# Patient Record
Sex: FEMALE | Race: BLACK OR AFRICAN AMERICAN | Employment: UNEMPLOYED | URBAN - METROPOLITAN AREA
[De-identification: names, ages, dates, MRNs, and addresses within clinical notes are randomized per-mention and may not be internally consistent; named-entity substitution may affect disease eponyms.]

---

## 2017-03-21 ENCOUNTER — OFFICE VISIT (OUTPATIENT)
Dept: FAMILY MEDICINE CLINIC | Age: 47
End: 2017-03-21

## 2017-03-21 VITALS
TEMPERATURE: 97.8 F | BODY MASS INDEX: 29.15 KG/M2 | HEIGHT: 62 IN | OXYGEN SATURATION: 98 % | RESPIRATION RATE: 16 BRPM | WEIGHT: 158.4 LBS | DIASTOLIC BLOOD PRESSURE: 79 MMHG | SYSTOLIC BLOOD PRESSURE: 127 MMHG

## 2017-03-21 DIAGNOSIS — E55.9 VITAMIN D DEFICIENCY: ICD-10-CM

## 2017-03-21 DIAGNOSIS — D50.9 IRON DEFICIENCY ANEMIA, UNSPECIFIED IRON DEFICIENCY ANEMIA TYPE: Primary | ICD-10-CM

## 2017-03-21 RX ORDER — GLUCOSAMINE SULFATE 1500 MG
POWDER IN PACKET (EA) ORAL DAILY
COMMUNITY
End: 2018-02-19

## 2017-03-21 NOTE — MR AVS SNAPSHOT
Visit Information Date & Time Provider Department Dept. Phone Encounter #  
 3/21/2017 10:45 AM Shana Mendez MD [facility] OFFICE-[facility] [phone] [id number] Upcoming Health Maintenance Date Due  
 PAP AKA CERVICAL CYTOLOGY 11/1/2017 DTaP/Tdap/Td series (2 - Td) 7/20/2022 Allergies as of 3/21/2017  Review Complete On: 3/21/2017 By: Karen Martin LPN Severity Noted Reaction Type Reactions Percocet [Oxycodone-acetaminophen]  04/26/2011    Itching Current Immunizations  Reviewed on 7/20/2012 Name Date TDAP Vaccine 7/20/2012 Not reviewed this visit You Were Diagnosed With   
  
 Codes Comments Iron deficiency anemia, unspecified iron deficiency anemia type    -  Primary ICD-10-CM: D50.9 ICD-9-CM: 280.9 Vitamin D deficiency     ICD-10-CM: E55.9 ICD-9-CM: 268.9 Vitals BP Temp Resp Height(growth percentile) Weight(growth percentile) LMP  
 127/79 97.8 °F (36.6 °C) (Oral) 16 5' 1.5\" (1.562 m) 158 lb 6.4 oz (71.8 kg) 03/07/2017 (Exact Date) SpO2 BMI OB Status Smoking Status 98% 29.44 kg/m2 Having regular periods Former Smoker Vitals History BMI and BSA Data Body Mass Index Body Surface Area  
 29.44 kg/m 2 1.77 m 2 Preferred Pharmacy Pharmacy Name Phone [facility] [address] [place] [address] [phone] Your Updated Medication List  
  
   
This list is accurate as of: 3/21/17 11:25 AM.  Always use your most recent med list.  
  
  
  
  
 acetaminophen 500 mg tablet Commonly known as:  TYLENOL Take 500 mg by mouth every six (6) hours as needed for Pain. fluocinoNIDE 0.05 % topical cream  
Commonly known as:  LIDEX Apply  to affected area two (2) times a day. multivitamin, tx-iron-ca-min 9 mg iron-400 mcg Tab tablet Commonly known as:  THERA-M w/ IRON Take 1 Tab by mouth daily. VITAMIN D3 1,000 unit Cap Generic drug:  cholecalciferol Take  by mouth daily. Pt taking every other day We Performed the Following CBC WITH AUTOMATED DIFF [63507 CPT(R)] VITAMIN D, 25 HYDROXY [id number] CPT(R)] Introducing [facility]! [employer] introduces [employer] patient portal. Now you can access parts of your medical record, email your doctor's office, and request medication refills online. 1. In your internet browser, go to [email or web address] 2. Click on the First Time User? Click Here link in the Sign In box. You will see the New Member Sign Up page. 3. Enter your [employer]ista Access Code exactly as it appears below. You will not need to use this code after youve completed the sign-up process. If you do not sign up before the expiration date, you must request a new code. · [employer]ista Access Code: [email or web address] Expires: 6/19/2017 11:25 AM 
 
4. Enter the last four digits of your Social Security Number (xxxx) and Date of Birth (mm/dd/yyyy) as indicated and click Submit. You will be taken to the next sign-up page. 5. Create a Foodista ID. This will be your [employer]ista login ID and cannot be changed, so think of one that is secure and easy to remember. 6. Create a [employer]ista password. You can change your password at any time. 7. Enter your Password Reset Question and Answer. This can be used at a later time if you forget your password. 8. Enter your e-mail address. You will receive e-mail notification when new information is available in [address] 9. Click Sign Up. You can now view and download portions of your medical record. 10. Click the Download Summary menu link to download a portable copy of your medical information. If you have questions, please visit the Frequently Asked Questions section of the [employer]ista website. Remember, [employer] is NOT to be used for urgent needs. For medical emergencies, dial 911. Now available from your iPhone and Android! Please provide this summary of care documentation to your next provider. Your primary care clinician is listed as Mack Bernal. If you have any questions after today's visit, please call [phone].

## 2017-03-21 NOTE — PROGRESS NOTES
Chief Complaint   Patient presents with    Vitamin D Deficiency     1. Have you been to the ER, urgent care clinic since your last visit? Hospitalized since your last visit? [facility]    2. Have you seen or consulted any other health care providers outside of the [address] [place] [address] since your last visit? Include any pap smears or colon screening. No     There are no preventive care reminders to display for this patient.

## 2017-03-21 NOTE — PROGRESS NOTES
HISTORY OF PRESENT ILLNESS  Esdras Anne is a 40 y.o. female here today for follow up of H[place] and Vitamin D deficiency. She is feeling well but stopped her iron pills because of constipation. She is taking One A Day, we talked about switching that to a prenatal to get more iron. She is also taking OTC Vit D 1000 every other day. Continues to follow w GYN for her periods which are still heavy. Abnormal Lab Results   The history is provided by the patient. This is a chronic problem. The current episode started more than 1 week ago. Pertinent negatives include no chest pain, no abdominal pain and no shortness of breath. Review of Systems   Respiratory: Negative for shortness of breath. Cardiovascular: Negative for chest pain. Gastrointestinal: Negative for abdominal pain. Physical Exam   Constitutional: She is oriented to person, place, and time. She appears well-developed and well-nourished. /79  Temp 97.8 °F (36.6 °C) (Oral)   Resp 16  Ht 5' 1.5\" (1.562 m)  Wt 158 lb 6.4 oz (71.8 kg)  LMP 03/07/2017 (Exact Date)  SpO2 98%  BMI 29.44 kg/m2     HENT:   Head: Normocephalic and atraumatic. Eyes: No scleral icterus. Neck: No thyromegaly present. Cardiovascular: Normal heart sounds. Pulmonary/Chest: Breath sounds normal.   Abdominal: Soft. There is no tenderness. Musculoskeletal: She exhibits no edema. Lymphadenopathy:     She has no cervical adenopathy. Neurological: She is alert and oriented to person, place, and time. Nursing note and vitals reviewed.     Patient Active Problem List   Diagnosis Code    Eczema L30.9    Vitamin D deficiency E55.9    History of bilateral tubal ligation Z98.51    Iron deficiency anemia D50.9     Past Medical History:   Diagnosis Date    Contact dermatitis and other eczema, due to unspecified cause     Headache(784.0)     [facility]     Social History     Social History    Marital status: SINGLE     Spouse name: N/A    Number of children: 1    Years of education: N/A     Occupational History          care giver      Social History Main Topics    Smoking status: Former Smoker     Years: 1.00     Quit date: 1/1/1994    Smokeless tobacco: Never Used      Comment: 1 pack yr    Alcohol use Yes      Comment: rarely     Drug use: No    Sexual activity: Yes     Partners: Male     Birth control/ protection: Condom, Surgical      Comment: single     Other Topics Concern    None     Social History Narrative     Family History   Problem Relation Age of Onset    Diabetes Mother     Hypertension Mother     Heart Disease Mother     Kidney Disease Mother      esrd on dilaysis     Heart Disease Father     Diabetes Sister     Hypertension Sister     Hypertension Brother     Diabetes Brother      Current Outpatient Prescriptions   Medication Sig    cholecalciferol (VITAMIN D3) 1,000 unit cap Take  by mouth daily. Pt taking every other day    multivitamin, tx-iron-ca-min (THERA-M W/ IRON) 9 mg iron-400 mcg tab tablet Take 1 Tab by mouth daily.  acetaminophen (TYLENOL) 500 mg tablet Take 500 mg by mouth every six (6) hours as needed for Pain.  fluocinoNIDE (LIDEX) 0.05 % topical cream Apply  to affected area two (2) times a day. Allergies   Allergen Reactions    Percocet [Oxycodone-Acetaminophen] Itching       ASSESSMENT and PLAN  Pt feeling well, labs pending, cont OTC supplements, will contact her w lab results and follow up recommendations. Care plan reviewed and pt understands. After visit summary printed and reviewed with patient. Arnold Juniorthony was seen today for vitamin d deficiency.     Diagnoses and all orders for this visit:    Iron deficiency anemia, unspecified iron deficiency anemia type  -     CBC WITH AUTOMATED DIFF    Vitamin D deficiency  -     VITAMIN D, 25 HYDROXY

## 2017-03-22 LAB
25(OH)D3+25(OH)D2 SERPL-MCNC: 13.3 NG/ML (ref 30–100)
BASOPHILS # BLD AUTO: 0 X10E3/UL (ref 0–0.2)
BASOPHILS NFR BLD AUTO: 0 %
EOSINOPHIL # BLD AUTO: 0 X10E3/UL (ref 0–0.4)
EOSINOPHIL NFR BLD AUTO: 1 %
ERYTHROCYTE [DISTWIDTH] IN BLOOD BY AUTOMATED COUNT: 16.3 % (ref 12.3–15.4)
HCT VFR BLD AUTO: 31.5 % (ref 34–46.6)
HGB BLD-MCNC: 9.7 G/DL (ref 11.1–15.9)
IMM GRANULOCYTES # BLD: 0 X10E3/UL (ref 0–0.1)
IMM GRANULOCYTES NFR BLD: 0 %
LYMPHOCYTES # BLD AUTO: 1.6 X10E3/UL (ref 0.7–3.1)
LYMPHOCYTES NFR BLD AUTO: 26 %
MCH RBC QN AUTO: 23.5 PG (ref 26.6–33)
MCHC RBC AUTO-ENTMCNC: 30.8 G/DL (ref 31.5–35.7)
MCV RBC AUTO: 76 FL (ref 79–97)
MONOCYTES # BLD AUTO: 0.7 X10E3/UL (ref 0.1–0.9)
MONOCYTES NFR BLD AUTO: 12 %
NEUTROPHILS # BLD AUTO: 3.7 X10E3/UL (ref 1.4–7)
NEUTROPHILS NFR BLD AUTO: 61 %
PLATELET # BLD AUTO: 304 X10E3/UL (ref 150–379)
RBC # BLD AUTO: 4.13 X10E6/UL (ref 3.77–5.28)
WBC # BLD AUTO: 6.1 X10E3/UL (ref 3.4–10.8)

## 2017-04-04 DIAGNOSIS — E55.9 VITAMIN D DEFICIENCY: Primary | ICD-10-CM

## 2017-04-04 RX ORDER — ERGOCALCIFEROL 1.25 MG/1
50000 CAPSULE ORAL
Qty: 4 CAP | Refills: 5 | Status: SHIPPED | OUTPATIENT
Start: 2017-04-04 | End: 2018-02-19

## 2017-04-04 NOTE — PROGRESS NOTES
Call pt, your iron levels are still low, try to take an iron pill every other day with stool softener. Vitamin D level is still low, I am sending prescription strength dose to pharmacy for you. Recommend follow up for repeat labs 3mths.

## 2018-02-19 ENCOUNTER — HOSPITAL ENCOUNTER (EMERGENCY)
Age: 48
Discharge: HOME | End: 2018-02-19
Attending: EMERGENCY MEDICINE
Payer: COMMERCIAL

## 2018-02-19 VITALS
RESPIRATION RATE: 16 BRPM | BODY MASS INDEX: 27.88 KG/M2 | OXYGEN SATURATION: 100 % | TEMPERATURE: 97.8 F | HEART RATE: 70 BPM | WEIGHT: 150 LBS | SYSTOLIC BLOOD PRESSURE: 143 MMHG | DIASTOLIC BLOOD PRESSURE: 77 MMHG

## 2018-02-19 DIAGNOSIS — Z87.898 HISTORY OF VERTIGO: Primary | ICD-10-CM

## 2018-02-19 DIAGNOSIS — Z86.59 HISTORY OF ANXIETY: ICD-10-CM

## 2018-02-19 LAB
ATRIAL RATE: 75 BPM
CALCULATED P AXIS, ECG09: 55 DEGREES
CALCULATED R AXIS, ECG10: 15 DEGREES
CALCULATED T AXIS, ECG11: 24 DEGREES
DIAGNOSIS, 93000: NORMAL
P-R INTERVAL, ECG05: 134 MS
Q-T INTERVAL, ECG07: 404 MS
QRS DURATION, ECG06: 82 MS
QTC CALCULATION (BEZET), ECG08: 451 MS
VENTRICULAR RATE, ECG03: 75 BPM

## 2018-02-19 PROCEDURE — 93005 ELECTROCARDIOGRAM TRACING: CPT

## 2018-02-19 PROCEDURE — 74011250637 HC RX REV CODE- 250/637: Performed by: PHYSICIAN ASSISTANT

## 2018-02-19 PROCEDURE — 99283 EMERGENCY DEPT VISIT LOW MDM: CPT

## 2018-02-19 PROCEDURE — 74011250636 HC RX REV CODE- 250/636: Performed by: PHYSICIAN ASSISTANT

## 2018-02-19 RX ORDER — ONDANSETRON 4 MG/1
4 TABLET, ORALLY DISINTEGRATING ORAL
Qty: 20 TAB | Refills: 0 | Status: SHIPPED | OUTPATIENT
Start: 2018-02-19 | End: 2020-01-16 | Stop reason: ALTCHOICE

## 2018-02-19 RX ORDER — MECLIZINE HYDROCHLORIDE 25 MG/1
25 TABLET ORAL
Qty: 20 TAB | Refills: 0 | Status: SHIPPED | OUTPATIENT
Start: 2018-02-19 | End: 2018-03-01

## 2018-02-19 RX ORDER — HYDROXYZINE 50 MG/1
50 TABLET, FILM COATED ORAL
Qty: 20 TAB | Refills: 0 | Status: SHIPPED | OUTPATIENT
Start: 2018-02-19 | End: 2018-03-01

## 2018-02-19 RX ORDER — ONDANSETRON 4 MG/1
4 TABLET, ORALLY DISINTEGRATING ORAL
Status: COMPLETED | OUTPATIENT
Start: 2018-02-19 | End: 2018-02-19

## 2018-02-19 RX ORDER — MECLIZINE HCL 12.5 MG 12.5 MG/1
25 TABLET ORAL
Status: COMPLETED | OUTPATIENT
Start: 2018-02-19 | End: 2018-02-19

## 2018-02-19 RX ADMIN — MECLIZINE 25 MG: 12.5 TABLET ORAL at 11:40

## 2018-02-19 RX ADMIN — ONDANSETRON 4 MG: 4 TABLET, ORALLY DISINTEGRATING ORAL at 11:40

## 2018-02-19 NOTE — LETTER
[address] 
[facility] EMERGENCY DEPT 
[address] [place], [place] [id number] [phone] Work/School Note Date: 2/19/2018 To Whom It May concern: 
 
Shani Peter was seen and treated today in the emergency room by the following provider(s): 
Attending Provider: Jamee Aparicio. MD Darnell 
Physician Assistant: Josefina Villatoro. Rayo Garcia may return to work on 2/21/2018. Sincerely, 
 
 
 
 
Josefina Villatoro.  Shorty Osman

## 2018-02-19 NOTE — ED NOTES
Received patient to exam room, sitting in a chair in a position of comfort, call bell within reach; pt states she woke up with dizziness and palpitations, states \"i feel like i'm going to have an anxiety attack\", pt states this feels like her previous episode of vertigo

## 2018-02-19 NOTE — DISCHARGE INSTRUCTIONS
Dizziness: Care Instructions  Your Care Instructions  Dizziness is the feeling of unsteadiness or fuzziness in your head. It is different than having vertigo, which is a feeling that the room is spinning or that you are moving or falling. It is also different from lightheadedness, which is the feeling that you are about to faint. It can be hard to know what causes dizziness. Some people feel dizzy when they have migraine headaches. Sometimes bouts of flu can make you feel dizzy. Some medical conditions, such as heart problems or high blood pressure, can make you feel dizzy. Many medicines can cause dizziness, including medicines for high blood pressure, pain, or anxiety. If a medicine causes your symptoms, your doctor may recommend that you stop or change the medicine. If it is a problem with your heart, you may need medicine to help your heart work better. If there is no clear reason for your symptoms, your doctor may suggest watching and waiting for a while to see if the dizziness goes away on its own. Follow-up care is a key part of your treatment and safety. Be sure to make and go to all appointments, and call your doctor if you are having problems. It's also a good idea to know your test results and keep a list of the medicines you take. How can you care for yourself at home? · If your doctor recommends or prescribes medicine, take it exactly as directed. Call your doctor if you think you are having a problem with your medicine. · Do not drive while you feel dizzy. · Try to prevent falls. Steps you can take include:  ¨ Using nonskid mats, adding grab bars near the tub, and using night-lights. ¨ Clearing your home so that walkways are free of anything you might trip on. ¨ Letting family and friends know that you have been feeling dizzy. This will help them know how to help you. When should you call for help? Call 911 anytime you think you may need emergency care.  For example, call if:  ? · You passed out (lost consciousness). ? · You have dizziness along with symptoms of a heart attack. These may include:  ¨ Chest pain or pressure, or a strange feeling in the chest.  ¨ Sweating. ¨ Shortness of breath. ¨ Nausea or vomiting. ¨ Pain, pressure, or a strange feeling in the back, neck, jaw, or upper belly or in one or both shoulders or arms. ¨ Lightheadedness or sudden weakness. ¨ A fast or irregular heartbeat. ? · You have symptoms of a stroke. These may include:  ¨ Sudden numbness, tingling, weakness, or loss of movement in your face, arm, or leg, especially on only one side of your body. ¨ Sudden vision changes. ¨ Sudden trouble speaking. ¨ Sudden confusion or trouble understanding simple statements. ¨ Sudden problems with walking or balance. ¨ A sudden, severe headache that is different from past headaches. ?Call your doctor now or seek immediate medical care if:  ? · You feel dizzy and have a fever, headache, or ringing in your ears. ? · You have new or increased nausea and vomiting. ? · Your dizziness does not go away or comes back. ? Watch closely for changes in your health, and be sure to contact your doctor if:  ? · You do not get better as expected. Where can you learn more? Go to http://fitz-tiffanie.info/. Enter E887 in the search box to learn more about \"Dizziness: Care Instructions. \"  Current as of: March 20, 2017  Content Version: 11.4  © 1478-1932 [employer]. Care instructions adapted under license by [employer] (which disclaims liability or warranty for this information). If you have questions about a medical condition or this instruction, always ask your healthcare professional. [place] [address] any warranty or liability for your use of this information.        Benign Paroxysmal Positional Vertigo (BPPV): Care Instructions  Your Care Instructions    Benign paroxysmal positional vertigo, also called BPPV, is an inner ear problem. It causes a spinning or whirling sensation when you move your head. This sensation is called vertigo. The vertigo usually lasts for less than a minute. People often have vertigo spells for a few days or weeks. Then the vertigo goes away. But it may come back again. The vertigo may be mild, or it may be bad enough to cause unsteadiness, nausea, and vomiting. When you move, your inner ear sends messages to the brain. This helps you keep your balance. Vertigo can happen when debris builds up in the inner ear. The buildup can cause the inner ear to send the wrong message to the brain. Your doctor may move you in different positions to help your vertigo get better faster. This is called the Epley maneuver. Your doctor may also prescribe medicines or exercises to help with your symptoms. Follow-up care is a key part of your treatment and safety. Be sure to make and go to all appointments, and call your doctor if you are having problems. It's also a good idea to know your test results and keep a list of the medicines you take. How can you care for yourself at home? · If your doctor suggests that you do De La Rosa-Daroff exercises:  ¨ Sit on the edge of a bed or sofa. Quickly lie down on the side that causes the worst vertigo. Lie on your side with your ear down. ¨ Stay in this position for at least 30 seconds or until the vertigo goes away. ¨ Sit up. If this causes vertigo, wait for it to stop. ¨ Repeat the procedure on the other side. ¨ Repeat this 10 times. Do these exercises 2 times a day until the vertigo is gone. When should you call for help? Call 911 anytime you think you may need emergency care. For example, call if:  ? · You have symptoms of a stroke. These may include:  ¨ Sudden numbness, tingling, weakness, or loss of movement in your face, arm, or leg, especially on only one side of your body. ¨ Sudden vision changes. ¨ Sudden trouble speaking.   ¨ Sudden confusion or trouble understanding simple statements. ¨ Sudden problems with walking or balance. ¨ A sudden, severe headache that is different from past headaches. ?Call your doctor now or seek immediate medical care if:  ? · You have new or worse nausea and vomiting. ? · You have new symptoms such as hearing loss or roaring in your ears. ? Watch closely for changes in your health, and be sure to contact your doctor if:  ? · You are not getting better as expected. ? · Your vertigo gets worse. Where can you learn more? Go to http://fitz-tiffanie.info/. Enter  in the search box to learn more about \"Benign Paroxysmal Positional Vertigo (BPPV): Care Instructions. \"  Current as of: May 12, 2017  Content Version: 11.4  © 9037-0112 [employer]. Care instructions adapted under license by [employer] (which disclaims liability or warranty for this information). If you have questions about a medical condition or this instruction, always ask your healthcare professional. [place] [address] any warranty or liability for your use of this information.

## 2018-02-19 NOTE — ED PROVIDER NOTES
EMERGENCY DEPARTMENT HISTORY AND PHYSICAL EXAM      Date: 2/19/2018  Patient Name: Gail Everett    History of Presenting Illness     Chief Complaint   Patient presents with    Dizziness     \"The room is spinning\"  Hx of vertigo       History Provided By: Patient    HPI: Gail Everett, 56 y.o. female with no pertinent PMHx, presents in wheelchair to the ED with cc of constant, waxing and waning dizziness with associated palpitations, nausea, and anxiety since onset this morning. Pt states that she previously experienced anxiety with her prior pregnancy for which current symptoms are similar, however, she denies chance of pregnancy as she is s/p tubal ligation. Additionally, she notes she has a hx of vertigo in the past, however, she denies any hx of palpitations. She states movement, such as sitting up, worsens her dizziness and nausea. She reports additional symptom of a mild, posterior HA (describing the pain as feeling like a \"squeezing\" sensation), and mild blurred vision; pt states her vision is 20-20 but has not had a recent check up \"in a while. \" She denies any cardiac or respiratory complications. She denies any personal hx of DM but notes she does have a strong family hx of DM. She denies any CP, SOB, fever, vomiting, or diarrhea. PCP: Stan Martinez MD    There are no other complaints, changes, or physical findings at this time. Current Outpatient Prescriptions   Medication Sig Dispense Refill    meclizine (ANTIVERT) 25 mg tablet Take 1 Tab by mouth three (3) times daily as needed for up to 10 days. 20 Tab 0    ondansetron (ZOFRAN ODT) 4 mg disintegrating tablet Take 1 Tab by mouth every eight (8) hours as needed for Nausea. 20 Tab 0    hydrOXYzine HCl (ATARAX) 50 mg tablet Take 1 Tab by mouth every six (6) hours as needed for Anxiety for up to 10 days.  20 Tab 0       Past History     Past Medical History:  Past Medical History:   Diagnosis Date    Contact dermatitis and other eczema, due to unspecified cause     Headache(784.0)     [facility] 13    [facility]       Past Surgical History:  Past Surgical History:   Procedure Laterality Date    HX GYN      D&C in     HX GYN       X1    HX TUBAL LIGATION         Family History:  Family History   Problem Relation Age of Onset    Diabetes Mother     Hypertension Mother     Heart Disease Mother     Kidney Disease Mother      esrd on dilaysis     Heart Disease Father     Diabetes Sister     Hypertension Sister     Hypertension Brother     Diabetes Brother        Social History:  Social History   Substance Use Topics    Smoking status: Former Smoker     Years: 1.00     Quit date: 1994    Smokeless tobacco: Never Used      Comment: 1 pack yr    Alcohol use Yes      Comment: rarely        Allergies: Allergies   Allergen Reactions    Percocet [Oxycodone-Acetaminophen] Itching         Review of Systems   Review of Systems   Constitutional: Negative. Negative for activity change, appetite change, chills, diaphoresis, fever and unexpected weight change. HENT: Negative for congestion, hearing loss, rhinorrhea, sinus pressure, sneezing, sore throat and trouble swallowing. Eyes: Positive for visual disturbance. Negative for pain, redness and itching. Respiratory: Negative for cough, shortness of breath and wheezing. Cardiovascular: Positive for palpitations. Negative for chest pain and leg swelling. Gastrointestinal: Positive for nausea. Negative for abdominal pain, constipation, diarrhea and vomiting. Genitourinary: Negative for dysuria. Musculoskeletal: Negative for arthralgias, gait problem and myalgias. Skin: Negative for color change, pallor, rash and wound. Neurological: Positive for dizziness and headaches. Negative for tremors, weakness, light-headedness and numbness. All other systems reviewed and are negative.       Physical Exam   Physical Exam   Constitutional: She is oriented to person, place, and time. Vital signs are normal. She appears well-developed and well-nourished. No distress. 56 y.o.  female in NAD  Communicates appropriately and in full sentences  Normal vital signs   HENT:   Head: Normocephalic and atraumatic. Right Ear: Tympanic membrane normal.   Left Ear: Tympanic membrane normal.   Eyes: Conjunctivae are normal. Pupils are equal, round, and reactive to light. Right eye exhibits no discharge. Left eye exhibits no discharge. Neck: Normal range of motion. Neck supple. No nuchal rigidity   Cardiovascular: Normal rate, regular rhythm and intact distal pulses. Pulmonary/Chest: Effort normal and breath sounds normal. No respiratory distress. She has no wheezes. Abdominal: Soft. Bowel sounds are normal. She exhibits no distension. There is no tenderness. Musculoskeletal: Normal range of motion. She exhibits no edema, tenderness or deformity. No neurologic, motor, vascular, or compartment embarrassment observed on exam. No focal neurologic deficits. Neurological: She is alert and oriented to person, place, and time. Coordination normal.   No focal neuro deficits. NVI. Neurologically intact of UE and LE B/L  Sensation intact and symmetrical of UE and LE B/L. Strength 5/5 of UE B/L, Strength 5/5 of LE B/L. Symmetric bulk and tone of LE muscle groups. Facial symmetry  GCS 15   Skin: Skin is warm and dry. No rash noted. She is not diaphoretic. No erythema. No pallor. Psychiatric: She has a normal mood and affect. Nursing note and vitals reviewed.         Diagnostic Study Results     Labs -  Recent Results (from the past 12 hour(s))   EKG, 12 LEAD, INITIAL    Collection Time: 02/19/18 11:49 AM   Result Value Ref Range    Ventricular Rate 75 BPM    Atrial Rate 75 BPM    P-R Interval 134 ms    QRS Duration 82 ms    Q-T Interval 404 ms    QTC Calculation (Bezet) 451 ms    Calculated P Axis 55 degrees    Calculated R Axis 15 degrees Calculated T Axis 24 degrees    Diagnosis       Normal sinus rhythm  Normal ECG  No previous ECGs available  Confirmed by Marisol Epperson ([id number]) on 2/19/2018 6:35:50 PM         Medical Decision Making     I am the first provider for this patient. I reviewed our electronic medical record system for any past medical records that were available that may contribute to the patients current condition, the nursing notes and vital signs from today's visit     Nursing notes will be reviewed as they become available in realtime while the pt is in the ED. Provider Notes/Medical Decision Making:  DDx: Vertigo, dehydration, viral illness, anxiety, arrhythmia, low suspicion ACS    55 yo presents with constellation of complaints. EKG normal. Physical reveals stable vital signs and she is neurologically intact without any focal findings. No history of head trauma. Suspect vertigo. Will discharge with close PCP and neurology follow-up. Pt expresses understanding. Provided customary ED return precautions. Progress Note:  11:30 AM  The patients presenting problems have been discussed, and they are in agreement with the care plan formulated and outlined with them. I have encouraged them to ask questions as they arise throughout their visit. Will continue to monitor. EKG interpretation: (Preliminary)  Rhythm: normal sinus rhythm; and regular . Rate (approx.): 75; Axis: normal; P wave: normal; QRS interval: normal ; ST/T wave: normal;   Written by ANNA Pablo, as dictated by Ashlyn Meier MD.    DISCHARGE NOTE:  12:02 PM  Oliver Butler  results have been reviewed with her. She has been counseled regarding her diagnosis. She verbally conveys understanding and agreement of the signs, symptoms, diagnosis, treatment and prognosis and additionally agrees to follow up as recommended with Dr. Melvin Christensen MD in 24 - 48 hours.   She also agrees with the care-plan and conveys that all of her questions have been answered. I have also put together some discharge instructions for her that include: 1) educational information regarding their diagnosis, 2) how to care for their diagnosis at home, as well a 3) list of reasons why they would want to return to the ED prior to their follow-up appointment, should their condition change. She and/or family's questions have been answered. I have encouraged them to see the official results in [facility]\" or to retrieve the specifics of their results from medical records. This note will not be viewable in [address] Plan:  1. Return precautions  2. Medications as prescribed  3. Follow-ups as discussed  Discharge Medication List as of 2/19/2018 11:59 AM      START taking these medications    Details   meclizine (ANTIVERT) 25 mg tablet Take 1 Tab by mouth three (3) times daily as needed for up to 10 days. , Normal, Disp-20 Tab, R-0      ondansetron (ZOFRAN ODT) 4 mg disintegrating tablet Take 1 Tab by mouth every eight (8) hours as needed for Nausea., Normal, Disp-20 Tab, R-0      hydrOXYzine HCl (ATARAX) 50 mg tablet Take 1 Tab by mouth every six (6) hours as needed for Anxiety for up to 10 days. , Normal, Disp-20 Tab, R-0           Follow-up Information     Follow up With Details Comments Contact Info    Didier Lemus MD Schedule an appointment as soon as possible for a visit in 2 days As needed, If symptoms worsen, Possible further evaluation and treatment [address]   [address] [place] [address]  [phone]      [facility] EMERGENCY DEPT Go to As needed, If symptoms worsen [address] [facility] [address] [place] [facility] Schedule an appointment as soon as possible for a visit in 2 days If symptoms worsen, As needed, Possible further evaluation and treatment [address] [facility] [address]  [place]  [phone]        Return to the closest emergency room or follow up sooner for any deterioration. Diagnosis     Clinical Impression:   1. History of vertigo    2. History of anxiety        Attestations: This note is prepared by Marilou Almeida, acting as Scribe for Jose Ledesma PA-C. Jose Ledesma PA-C: The scribe's documentation has been prepared under my direction and personally reviewed by me in its entirety. I confirm that the note above accurately reflects all work, treatment, procedures, and medical decision making performed by me.

## 2019-07-19 ENCOUNTER — OFFICE VISIT (OUTPATIENT)
Dept: FAMILY MEDICINE CLINIC | Age: 49
End: 2019-07-19

## 2019-07-19 VITALS
HEIGHT: 62 IN | HEART RATE: 75 BPM | OXYGEN SATURATION: 99 % | TEMPERATURE: 97.5 F | BODY MASS INDEX: 29.04 KG/M2 | RESPIRATION RATE: 19 BRPM | WEIGHT: 157.8 LBS | SYSTOLIC BLOOD PRESSURE: 136 MMHG | DIASTOLIC BLOOD PRESSURE: 91 MMHG

## 2019-07-19 DIAGNOSIS — R03.0 ELEVATED BLOOD-PRESSURE READING WITHOUT DIAGNOSIS OF HYPERTENSION: ICD-10-CM

## 2019-07-19 DIAGNOSIS — D50.8 OTHER IRON DEFICIENCY ANEMIA: ICD-10-CM

## 2019-07-19 DIAGNOSIS — Z12.31 ENCOUNTER FOR SCREENING MAMMOGRAM FOR MALIGNANT NEOPLASM OF BREAST: ICD-10-CM

## 2019-07-19 DIAGNOSIS — L20.82 FLEXURAL ECZEMA: ICD-10-CM

## 2019-07-19 DIAGNOSIS — E55.9 VITAMIN D DEFICIENCY: ICD-10-CM

## 2019-07-19 DIAGNOSIS — Z83.3 FAMILY HISTORY OF DIABETES MELLITUS IN MOTHER: ICD-10-CM

## 2019-07-19 DIAGNOSIS — Z00.00 WELL WOMAN EXAM (NO GYNECOLOGICAL EXAM): Primary | ICD-10-CM

## 2019-07-19 RX ORDER — METHYLPREDNISOLONE 4 MG/1
TABLET ORAL
COMMUNITY
Start: 2019-06-11 | End: 2020-01-16 | Stop reason: ALTCHOICE

## 2019-07-19 RX ORDER — LANOLIN ALCOHOL/MO/W.PET/CERES
CREAM (GRAM) TOPICAL 2 TIMES DAILY
COMMUNITY
End: 2019-10-08 | Stop reason: SDUPTHER

## 2019-07-19 RX ORDER — BETAMETHASONE DIPROPIONATE 0.5 MG/G
CREAM TOPICAL 2 TIMES DAILY
Qty: 45 G | Refills: 2 | Status: SHIPPED | OUTPATIENT
Start: 2019-07-19 | End: 2020-01-16 | Stop reason: ALTCHOICE

## 2019-07-19 RX ORDER — ACETAMINOPHEN 500 MG
TABLET ORAL
COMMUNITY

## 2019-07-19 RX ORDER — METHOCARBAMOL 500 MG/1
TABLET, FILM COATED ORAL
COMMUNITY
Start: 2019-06-11 | End: 2020-01-16 | Stop reason: ALTCHOICE

## 2019-07-19 RX ORDER — MECLIZINE HYDROCHLORIDE 25 MG/1
TABLET ORAL
COMMUNITY
End: 2019-10-08 | Stop reason: SDUPTHER

## 2019-07-19 RX ORDER — ERGOCALCIFEROL 1.25 MG/1
50000 CAPSULE ORAL
COMMUNITY
End: 2019-10-08 | Stop reason: SDUPTHER

## 2019-07-19 NOTE — PROGRESS NOTES
Subjective:   53 y.o. female for Well Woman Check. Her gyne and breast care is done elsewhere by her Ob-Gyne physician. Last wellness exam was few years ago  patient is currently up todate w/ all vaccination, last tetanus vaccine was 2012,  patient has had no hx of fall, not feeling depressed, no blood in the stool no tarry stool, still driving at this time,      Medications causing fall and the risk for future fall detailed for the pt today the depression at this age addressed pt with improvig interests and enjoy to do things, not anxious not depressed,  pt at this visit, is physically functional with gait  and nicely independent and walks w/out mobility device ,  mentaly is very functional,  very Alert and oriented, unfortunately,  BMI for pt's age is into overweight versus obesity state,    the  [facility] BMI r/w'd and information given,  ,,     last mammog was nl and 2013,  last pap smear normal and was in a few years ago   . last colonoscopy was never,   No family hx of breast cancer       no family hx of colon cancer, patient is stating that the pt is trying to be sexaully active,  ++physically active,  compliant w/ meds, ++Rf needed for today for currentt meds. Current Outpatient Medications   Medication Sig Dispense Refill    ergocalciferol (VITAMIN D2) 50,000 unit capsule Take 50,000 Units by mouth every seven (7) days.  ferrous sulfate 325 mg (65 mg iron) tablet Take  by mouth two (2) times a day.  meclizine (ANTIVERT) 25 mg tablet Take  by mouth three (3) times daily as needed.  acetaminophen (TYLENOL) 500 mg tablet Take  by mouth every six (6) hours as needed for Pain.  betamethasone dipropionate (DIPROSONE) 0.05 % topical cream Apply  to affected area two (2) times a day.  45 g 2    methocarbamol (ROBAXIN) 500 mg tablet       methylPREDNISolone (MEDROL DOSEPACK) 4 mg tablet       ondansetron (ZOFRAN ODT) 4 mg disintegrating tablet Take 1 Tab by mouth every eight (8) hours as needed for Nausea. 20 Tab 0     Allergies   Allergen Reactions    Percocet [Oxycodone-Acetaminophen] Itching     Past Medical History:   Diagnosis Date    Contact dermatitis and other eczema, due to unspecified cause     Headache(784.0)     Other screening mammogram 13    [facility]-Normal-repeat one yr     Past Surgical History:   Procedure Laterality Date    HX GYN      D&C in     HX GYN       X1    HX TUBAL LIGATION       Family History   Problem Relation Age of Onset    Diabetes Mother     Hypertension Mother     Heart Disease Mother     Kidney Disease Mother         esrd on dilaysis     Heart Disease Father     Diabetes Sister     Hypertension Sister     Hypertension Brother     Diabetes Brother      Social History     Tobacco Use    Smoking status: Former Smoker     Years: 1.00     Last attempt to quit: 1994     Years since quittin.5    Smokeless tobacco: Never Used    Tobacco comment: 1 pack yr   Substance Use Topics    Alcohol use: Not Currently        Lab Results   Component Value Date/Time    WBC 5.1 2019 12:41 PM    HGB 11.7 2019 12:41 PM    HCT 35.3 2019 12:41 PM    PLATELET 000  12:41 PM    MCV 86 2019 12:41 PM     Lab Results   Component Value Date/Time    Hemoglobin A1c 5.4 2019 12:41 PM    Hemoglobin A1c 5.6 2013 10:18 AM    Hemoglobin A1c 5.8 (H) 2012 09:43 AM    Glucose 80 2019 12:41 PM    LDL, calculated 116 (H) 2019 12:41 PM    Creatinine 0.82 2019 12:41 PM      Lab Results   Component Value Date/Time    Cholesterol, total 228 (H) 2019 12:41 PM    HDL Cholesterol 88 2019 12:41 PM    LDL, calculated 116 (H) 2019 12:41 PM    Triglyceride 118 2019 12:41 PM        Review of Systems    Constitutional: no chills and fever, close to obese, nad     HENT: no ear head pain and nosebleeds. No blurred vision, pain and discharge.    Respiratory: no shortness of breath, wheezing cough nor sore throat. Cardiovascular: Has no chest pain, leg swelling ,and racing heart . Gastrointestinal: No constipation, diarrhea, nausea and vomiting. Genitourinary: No frequency. Musculoskeletal: Negative for joint pain. Skin: no itching, pimples or acne rash. Neurological: Negative for dizziness, no tremors  Psychiatric/Behavioral: Negative for depression has normal interest to do things and not depressed the patient is not nervous/anxious. Specific concerns today: Rash of the neck  Started few weeks ago not better tried alcohol washing and OTC antibiotic ointments, dosenot tingles and not pain full, states that is notexpanding red, and not  swelled up, whitish patches rounds, with itchiness      HTN  Today pt present for Bp check and the patient stating that so far there has not been on any blood pressure medication ,   having had the low salt diet and try to the best of pt's knowledge to avoid smoked meats, the patient has been trying to have an active life style and does do the daily walking,        Objective: The patient appears well, alert, oriented x 3, in no distress. Visit Vitals  BP (!) 136/91 (BP 1 Location: Left arm, BP Patient Position: At rest)   Pulse 75   Temp 97.5 °F (36.4 °C) (Oral)   Resp 19   Ht 5' 1.5\" (1.562 m)   Wt 157 lb 12.8 oz (71.6 kg)   LMP 07/06/2019   SpO2 99%   BMI 29.33 kg/m²     ENT normal.  Neck supple. No adenopathy or thyromegaly. ELVIS. Lungs are clear, good air entry, no wheezes, rhonchi or rales. S1 and S2 normal, no murmurs, regular rate and rhythm. Abdomen soft without tenderness, guarding, mass or organomegaly. Extremities show no edema, normal peripheral pulses. Neurological is normal, no focal findings. Breast and Pelvic exams are deferred. Assessment/Plan:   Well Woman    Diagnoses and all orders for this visit:    1. Well woman exam (no gynecological exam)  Comments:  [V70.0]    2.  Encounter for screening mammogram for malignant neoplasm of breast  -     DOMINIQUE MAMMO BI SCREENING INCL CAD; Future    3. Other iron deficiency anemia  -     CBC W/O DIFF  -     HEMOGLOBIN A1C WITH EAG  -     TSH 3RD GENERATION  -     METABOLIC PANEL, COMPREHENSIVE  -     LIPID PANEL  -     VITAMIN D, 25 HYDROXY    4. Family history of diabetes mellitus in mother  -     CBC W/O DIFF  -     HEMOGLOBIN A1C WITH EAG  -     TSH 3RD GENERATION  -     METABOLIC PANEL, COMPREHENSIVE  -     LIPID PANEL    5. Elevated blood-pressure reading without diagnosis of hypertension  -     CBC W/O DIFF  -     HEMOGLOBIN A1C WITH EAG  -     TSH 3RD GENERATION  -     METABOLIC PANEL, COMPREHENSIVE  -     LIPID PANEL    6. Vitamin D deficiency  -     VITAMIN D, 25 HYDROXY    7. Flexural eczema  -     betamethasone dipropionate (DIPROSONE) 0.05 % topical cream; Apply  to affected area two (2) times a day.          lose weight, increase physical activity, limit alcohol consumption, stop smoking (advice and handout given), follow low fat diet, follow low salt diet, continue present plan, routine labs ordered, call if any problems

## 2019-07-19 NOTE — LETTER
7/25/2019 7:06 AM 
 
Ms. Margarito Mccray [address] [place] [address] Dear Margarito Mccray: 
 
Please find your most recent results below. Recent Results (from the past 1344 hour(s)) CBC W/O DIFF Collection Time: 07/19/19 12:41 PM  
Result Value Ref Range WBC 5.1 3.4 - 10.8 x10E3/uL  
 RBC 4.09 3.77 - 5.28 x10E6/uL HGB 11.7 11.1 - 15.9 g/dL HCT 35.3 34.0 - 46.6 % MCV 86 79 - 97 fL  
 MCH 28.6 26.6 - 33.0 pg  
 MCHC 33.1 31.5 - 35.7 g/dL  
 RDW 14.2 12.3 - 15.4 % PLATELET 504 577 - 111 x10E3/uL HEMOGLOBIN A1C WITH EAG Collection Time: 07/19/19 12:41 PM  
Result Value Ref Range Hemoglobin A1c 5.4 4.8 - 5.6 % Estimated average glucose 108 mg/dL TSH 3RD GENERATION Collection Time: 07/19/19 12:41 PM  
Result Value Ref Range TSH 1.090 0.450 - 4.500 uIU/mL METABOLIC PANEL, COMPREHENSIVE Collection Time: 07/19/19 12:41 PM  
Result Value Ref Range Glucose 80 65 - 99 mg/dL BUN 6 6 - 24 mg/dL Creatinine 0.82 0.57 - 1.00 mg/dL GFR est non-AA 85 >59 mL/min/1.73 GFR est AA 99 >59 mL/min/1.73  
 BUN/Creatinine ratio 7 (L) 9 - 23 Sodium 138 134 - 144 mmol/L Potassium 4.7 3.5 - 5.2 mmol/L Chloride 104 96 - 106 mmol/L  
 CO2 22 20 - 29 mmol/L Calcium 9.0 8.7 - 10.2 mg/dL Protein, total 6.9 6.0 - 8.5 g/dL Albumin 3.8 3.5 - 5.5 g/dL GLOBULIN, TOTAL 3.1 1.5 - 4.5 g/dL A-G Ratio 1.2 1.2 - 2.2 Bilirubin, total 0.3 0.0 - 1.2 mg/dL Alk. phosphatase 57 39 - 117 IU/L  
 AST (SGOT) 26 0 - 40 IU/L  
 ALT (SGPT) 21 0 - 32 IU/L  
LIPID PANEL Collection Time: 07/19/19 12:41 PM  
Result Value Ref Range Cholesterol, total 228 (H) 100 - 199 mg/dL Triglyceride 118 0 - 149 mg/dL HDL Cholesterol 88 >39 mg/dL VLDL, calculated 24 5 - 40 mg/dL LDL, calculated 116 (H) 0 - 99 mg/dL VITAMIN D, 25 HYDROXY Collection Time: 07/19/19 12:41 PM  
Result Value Ref Range VITAMIN D, 25-HYDROXY 18.9 (L) 30.0 - 100.0 ng/mL RECOMMENDATIONS: 
 
 
I am writting to tell you that your lab results are normal,   except for low level of vitamin-D, please take over the counter vitamin-D,  one tablet daily, available at your Pharmacy,   weight bearing exercises most days of the week Keep up the good work! Work on diet and exercise. Continue with current  diet and medications. Please call me if you have any questions: [phone] Sincerely, 
 
 
Shawn Esteban MD

## 2019-07-19 NOTE — PATIENT INSTRUCTIONS
Hair Loss From Alopecia Areata: Care Instructions  Your Care Instructions    Alopecia areata is a type of hair loss that affects the hair on the scalp or other areas of the body. It's a problem that can go away for some time and then come back. This condition is most common in people who are younger than 21, but it can happen to children and adults of any age. Hair loss can affect how you feel about yourself. Your hair may fall out in clumps and grow back over time. In rare cases, a person with alopecia may lose all body hair. The pattern of hair loss and growth is different for everyone. You can treat alopecia with medicine, but treatment does not always work. You may have shots of medicine in your scalp or skin, take pills, or put the medicine on your scalp or skin. Or you may decide to wait and see whether your hair grows again before trying medicine. Because hair loss is upsetting for most people, seek support from family and friends. Talk to a counselor or other professional if you need more help. Follow-up care is a key part of your treatment and safety. Be sure to make and go to all appointments, and call your doctor if you are having problems. It's also a good idea to know your test results and keep a list of the medicines you take. How can you care for yourself at home? · If you decide to treat your hair loss, use medicines exactly as prescribed. Call your doctor if you think you are having a problem with your medicine. · If you want to cover your scalp, you can use hats, scarves, or other head coverings. Or you may want to wear a hairpiece or a wig. · Try hair care products and styling techniques. Hair care products or perms may make hair appear thicker. You can use dyes to color the scalp. But long-term use of perms or dyes may lead to more hair loss. · Talk to your doctor if you are very upset about your hair loss. You can get counseling to help you cope with the condition.   When should you call for help? Watch closely for changes in your health, and be sure to contact your doctor if:    · You do not get better as expected. Where can you learn more? Go to http://fitz-tiffanie.info/. Enter X505 in the search box to learn more about \"Hair Loss From Alopecia Areata: Care Instructions. \"  Current as of: April 17, 2018  Content Version: 11.9  © 3698-9068 [employer]. Care instructions adapted under license by [employer] (which disclaims liability or warranty for this information). If you have questions about a medical condition or this instruction, always ask your healthcare professional. [address] any warranty or liability for your use of this information.

## 2019-07-19 NOTE — PROGRESS NOTES
Name and  verified      Chief Complaint   Patient presents with   [address]     Former patient of Dr. Amber Kenney Alopecia     for one week       Patient stated last PAP EXAM 2015.

## 2019-07-20 LAB
25(OH)D3+25(OH)D2 SERPL-MCNC: 18.9 NG/ML (ref 30–100)
ALBUMIN SERPL-MCNC: 3.8 G/DL (ref 3.5–5.5)
ALBUMIN/GLOB SERPL: 1.2 {RATIO} (ref 1.2–2.2)
ALP SERPL-CCNC: 57 IU/L (ref 39–117)
ALT SERPL-CCNC: 21 IU/L (ref 0–32)
AST SERPL-CCNC: 26 IU/L (ref 0–40)
BILIRUB SERPL-MCNC: 0.3 MG/DL (ref 0–1.2)
BUN SERPL-MCNC: 6 MG/DL (ref 6–24)
BUN/CREAT SERPL: 7 (ref 9–23)
CALCIUM SERPL-MCNC: 9 MG/DL (ref 8.7–10.2)
CHLORIDE SERPL-SCNC: 104 MMOL/L (ref 96–106)
CHOLEST SERPL-MCNC: 228 MG/DL (ref 100–199)
CO2 SERPL-SCNC: 22 MMOL/L (ref 20–29)
CREAT SERPL-MCNC: 0.82 MG/DL (ref 0.57–1)
ERYTHROCYTE [DISTWIDTH] IN BLOOD BY AUTOMATED COUNT: 14.2 % (ref 12.3–15.4)
EST. AVERAGE GLUCOSE BLD GHB EST-MCNC: 108 MG/DL
GLOBULIN SER CALC-MCNC: 3.1 G/DL (ref 1.5–4.5)
GLUCOSE SERPL-MCNC: 80 MG/DL (ref 65–99)
HBA1C MFR BLD: 5.4 % (ref 4.8–5.6)
HCT VFR BLD AUTO: 35.3 % (ref 34–46.6)
HDLC SERPL-MCNC: 88 MG/DL
HGB BLD-MCNC: 11.7 G/DL (ref 11.1–15.9)
LDLC SERPL CALC-MCNC: 116 MG/DL (ref 0–99)
MCH RBC QN AUTO: 28.6 PG (ref 26.6–33)
MCHC RBC AUTO-ENTMCNC: 33.1 G/DL (ref 31.5–35.7)
MCV RBC AUTO: 86 FL (ref 79–97)
PLATELET # BLD AUTO: 278 X10E3/UL (ref 150–450)
POTASSIUM SERPL-SCNC: 4.7 MMOL/L (ref 3.5–5.2)
PROT SERPL-MCNC: 6.9 G/DL (ref 6–8.5)
RBC # BLD AUTO: 4.09 X10E6/UL (ref 3.77–5.28)
SODIUM SERPL-SCNC: 138 MMOL/L (ref 134–144)
TRIGL SERPL-MCNC: 118 MG/DL (ref 0–149)
TSH SERPL DL<=0.005 MIU/L-ACNC: 1.09 UIU/ML (ref 0.45–4.5)
VLDLC SERPL CALC-MCNC: 24 MG/DL (ref 5–40)
WBC # BLD AUTO: 5.1 X10E3/UL (ref 3.4–10.8)

## 2019-09-03 ENCOUNTER — HOSPITAL ENCOUNTER (OUTPATIENT)
Dept: MAMMOGRAPHY | Age: 49
Discharge: HOME | End: 2019-09-03
Attending: FAMILY MEDICINE
Payer: MEDICAID

## 2019-09-03 DIAGNOSIS — Z12.31 ENCOUNTER FOR SCREENING MAMMOGRAM FOR MALIGNANT NEOPLASM OF BREAST: ICD-10-CM

## 2019-09-03 PROCEDURE — 77067 SCR MAMMO BI INCL CAD: CPT

## 2019-10-08 ENCOUNTER — OFFICE VISIT (OUTPATIENT)
Dept: FAMILY MEDICINE CLINIC | Age: 49
End: 2019-10-08

## 2019-10-08 VITALS
BODY MASS INDEX: 29.08 KG/M2 | HEART RATE: 74 BPM | WEIGHT: 158 LBS | SYSTOLIC BLOOD PRESSURE: 128 MMHG | OXYGEN SATURATION: 99 % | RESPIRATION RATE: 20 BRPM | HEIGHT: 62 IN | TEMPERATURE: 98 F | DIASTOLIC BLOOD PRESSURE: 93 MMHG

## 2019-10-08 DIAGNOSIS — D50.8 OTHER IRON DEFICIENCY ANEMIA: ICD-10-CM

## 2019-10-08 DIAGNOSIS — E55.9 VITAMIN D DEFICIENCY: Primary | ICD-10-CM

## 2019-10-08 DIAGNOSIS — R42 SPINNING SENSATION: ICD-10-CM

## 2019-10-08 DIAGNOSIS — N75.0 BARTHOLIN GLAND CYST: ICD-10-CM

## 2019-10-08 DIAGNOSIS — R22.1 LOCALIZED SWELLING, MASS AND LUMP, NECK: ICD-10-CM

## 2019-10-08 RX ORDER — CEPHALEXIN 500 MG/1
CAPSULE ORAL
Refills: 0 | COMMUNITY
Start: 2019-09-23 | End: 2020-01-16 | Stop reason: ALTCHOICE

## 2019-10-08 RX ORDER — DICLOFENAC SODIUM 75 MG/1
TABLET, DELAYED RELEASE ORAL
Refills: 0 | COMMUNITY
Start: 2019-09-23 | End: 2020-01-16 | Stop reason: ALTCHOICE

## 2019-10-08 RX ORDER — ERGOCALCIFEROL 1.25 MG/1
50000 CAPSULE ORAL
Qty: 4 CAP | Refills: 11
Start: 2019-10-08 | End: 2020-09-18 | Stop reason: SDUPTHER

## 2019-10-08 RX ORDER — MECLIZINE HYDROCHLORIDE 25 MG/1
25 TABLET ORAL
Qty: 30 TAB | Refills: 1 | Status: SHIPPED | OUTPATIENT
Start: 2019-10-08 | End: 2019-11-07

## 2019-10-08 RX ORDER — LANOLIN ALCOHOL/MO/W.PET/CERES
325 CREAM (GRAM) TOPICAL 2 TIMES DAILY
Qty: 60 TAB | Refills: 2 | Status: SHIPPED | OUTPATIENT
Start: 2019-10-08 | End: 2021-01-07 | Stop reason: SDUPTHER

## 2019-10-08 NOTE — PROGRESS NOTES
HISTORY OF PRESENT ILLNESS  Dolly Greene is a 53 y.o. female. HPI   Chief Complaint   Patient presents with    Mass     Left jaw area. Patient stated chewing is painful. Visited [facility] 9/22/2019 Antibiotic given and she has completed.  Vaginal Discharge     Started on 10/4/2019 patient discomfort while voiding.  Cyst     Vagina patient stated noticed on 10/4/2019     Lump on the left neck Started few days ago not better, the patient has tried alcohol washing and OTC antibiotic ointments,  no family member have the same problem, it does not tingles and it is pain full, states that is not expanding red, and is swelled up, like a lump, was given keflex for 10 days finished it nicely    Vaginal lump    The history is provided by the patient. This is a new problem. The current episode started more than 1 week ago. The problem occurs daily. The problem has not changed since onset. The discharge occurs spontaneously. no discharge no recent intercourse. She is not pregnant. She has not missed her period, she is on her period now. Associated symptoms does not include dyspareunia, frequency, genital burning, genital itching and perineal odor.       Patient presents with current history of  vertigo stating that her problem goes away with on Meclizine for which needs refill at this time, never seen the neurologist, has no chest pain,  as per the patient each episode lasting few seconds, with  +spinning sensation, patient has no sweating no chest pain no LOC, currently not working and is able to drive without any trouble in fact patient drove to [facility] today without an family member, patient also stating that is not stressed out, patient denies any neck pain and has no nausea vomiting in addition, the hearing is normal and gets ringing sensation patient states that trouble started few years ago and patient was told to have vertigo has been given meclizine ,  unfortunately this is not the patient first time,         Current Outpatient Medications   Medication Sig Dispense Refill    ergocalciferol (VITAMIN D2) 50,000 unit capsule Take 50,000 Units by mouth every seven (7) days.  ferrous sulfate 325 mg (65 mg iron) tablet Take  by mouth two (2) times a day.  meclizine (ANTIVERT) 25 mg tablet Take  by mouth three (3) times daily as needed.  acetaminophen (TYLENOL) 500 mg tablet Take  by mouth every six (6) hours as needed for Pain.  betamethasone dipropionate (DIPROSONE) 0.05 % topical cream Apply  to affected area two (2) times a day. 45 g 2    cephALEXin (KEFLEX) 500 mg capsule TAKE 1 CAPSULE BY MOUTH THREE TIMES A DAY FOR 10 DAYS  0    diclofenac EC (VOLTAREN) 75 mg EC tablet TAKE 1 TABLET BY MOUTH TWICE A DAY AS NEEDED WITH FOOD  0    methocarbamol (ROBAXIN) 500 mg tablet       methylPREDNISolone (MEDROL DOSEPACK) 4 mg tablet       ondansetron (ZOFRAN ODT) 4 mg disintegrating tablet Take 1 Tab by mouth every eight (8) hours as needed for Nausea.  20 Tab 0     Allergies   Allergen Reactions    Percocet [Oxycodone-Acetaminophen] Itching     Past Medical History:   Diagnosis Date    Contact dermatitis and other eczema, due to unspecified cause     Headache(784.0)     Other screening mammogram 13    [facility]-Normal-repeat one yr     Past Surgical History:   Procedure Laterality Date    HX GYN      D&C in     HX GYN       X1    HX TUBAL LIGATION       Family History   Problem Relation Age of Onset    Diabetes Mother     Hypertension Mother     Heart Disease Mother     Kidney Disease Mother         esrd on dilaysis     Heart Disease Father     Diabetes Sister     Hypertension Sister     Hypertension Brother     Diabetes Brother      Social History     Tobacco Use    Smoking status: Former Smoker     Years: 1.00     Last attempt to quit: 1994     Years since quittin.7    Smokeless tobacco: Never Used    Tobacco comment: 1 pack yr   Substance Use Topics    Alcohol use: Not Currently      Lab Results   Component Value Date/Time    Hemoglobin A1c 5.4 07/19/2019 12:41 PM    Hemoglobin A1c 5.6 01/29/2013 10:18 AM    Hemoglobin A1c 5.8 (H) 07/20/2012 09:43 AM    Glucose 80 07/19/2019 12:41 PM    LDL, calculated 116 (H) 07/19/2019 12:41 PM    Creatinine 0.82 07/19/2019 12:41 PM      Lab Results   Component Value Date/Time    Cholesterol, total 228 (H) 07/19/2019 12:41 PM    HDL Cholesterol 88 07/19/2019 12:41 PM    LDL, calculated 116 (H) 07/19/2019 12:41 PM    Triglyceride 118 07/19/2019 12:41 PM        Review of Systems   Constitutional: Negative for chills, fever and malaise/fatigue. HENT: Negative for congestion and nosebleeds. Eyes: Negative for blurred vision and pain. Respiratory: Negative for shortness of breath and wheezing. Cardiovascular: Negative for chest pain, palpitations and leg swelling. Gastrointestinal: Negative for constipation, diarrhea, nausea and vomiting. Genitourinary: Negative for dysuria and frequency. Musculoskeletal: Negative for joint pain and myalgias. Skin: Negative for itching and rash. Neurological: Negative for dizziness, loss of consciousness and headaches. Endo/Heme/Allergies: Does not bruise/bleed easily. Psychiatric/Behavioral: Negative for depression. The patient is not nervous/anxious and does not have insomnia. All other systems reviewed and are negative. Physical Exam   Constitutional: She is oriented to person, place, and time. She appears well-developed and well-nourished. HENT:   Head: Normocephalic and atraumatic. Eyes: Conjunctivae and EOM are normal.   Neck: Normal range of motion. Neck supple. Cardiovascular: Normal rate, regular rhythm and normal heart sounds. No murmur heard. Pulmonary/Chest: Effort normal and breath sounds normal. No stridor. Abdominal: Soft. Bowel sounds are normal. She exhibits no distension and no mass. There is no tenderness.    Musculoskeletal: Normal range of motion. She exhibits no edema. Nl pulses, nl visual inspection    Lymphadenopathy:     She has no cervical adenopathy. Neurological: She is alert and oriented to person, place, and time. Skin: No erythema. Psychiatric: Her behavior is normal.   Nursing note and vitals reviewed. ASSESSMENT and PLAN  Diagnoses and all orders for this visit:    1. Vitamin D deficiency  -     ergocalciferol (VITAMIN D2) 50,000 unit capsule; Take 1 Cap by mouth every seven (7) days. 2. Other iron deficiency anemia  -     ferrous sulfate 325 mg (65 mg iron) tablet; Take 1 Tab by mouth two (2) times a day. 3. Localized swelling, mass and lump, neck  -     US THYROID/PARATHYROID/SOFT TISS; Future    4. Spinning sensation  -     meclizine (ANTIVERT) 25 mg tablet;  Take 1 Tab by mouth three (3) times daily as needed for Dizziness for up to 30 days.  -     REFERRAL TO NEUROLOGY    5. Bartholin gland cyst  -     CA SCREEN;PELVIC/BREAST EXAM    pt was told that the cyst is not large enough at this time was told that it will need to be treated supportively, pt agreed

## 2019-10-08 NOTE — PATIENT INSTRUCTIONS
Dizziness: Care Instructions  Your Care Instructions  Dizziness is the feeling of unsteadiness or fuzziness in your head. It is different than having vertigo, which is a feeling that the room is spinning or that you are moving or falling. It is also different from lightheadedness, which is the feeling that you are about to faint. It can be hard to know what causes dizziness. Some people feel dizzy when they have migraine headaches. Sometimes bouts of flu can make you feel dizzy. Some medical conditions, such as heart problems or high blood pressure, can make you feel dizzy. Many medicines can cause dizziness, including medicines for high blood pressure, pain, or anxiety. If a medicine causes your symptoms, your doctor may recommend that you stop or change the medicine. If it is a problem with your heart, you may need medicine to help your heart work better. If there is no clear reason for your symptoms, your doctor may suggest watching and waiting for a while to see if the dizziness goes away on its own. Follow-up care is a key part of your treatment and safety. Be sure to make and go to all appointments, and call your doctor if you are having problems. It's also a good idea to know your test results and keep a list of the medicines you take. How can you care for yourself at home? · If your doctor recommends or prescribes medicine, take it exactly as directed. Call your doctor if you think you are having a problem with your medicine. · Do not drive while you feel dizzy. · Try to prevent falls. Steps you can take include:  ? Using nonskid mats, adding grab bars near the tub, and using night-lights. ? Clearing your home so that walkways are free of anything you might trip on.  ? Letting family and friends know that you have been feeling dizzy. This will help them know how to help you. When should you call for help? Call 911 anytime you think you may need emergency care.  For example, call if:    · You passed out (lost consciousness).     · You have dizziness along with symptoms of a heart attack. These may include:  ? Chest pain or pressure, or a strange feeling in the chest.  ? Sweating. ? Shortness of breath. ? Nausea or vomiting. ? Pain, pressure, or a strange feeling in the back, neck, jaw, or upper belly or in one or both shoulders or arms. ? Lightheadedness or sudden weakness. ? A fast or irregular heartbeat.     · You have symptoms of a stroke. These may include:  ? Sudden numbness, tingling, weakness, or loss of movement in your face, arm, or leg, especially on only one side of your body. ? Sudden vision changes. ? Sudden trouble speaking. ? Sudden confusion or trouble understanding simple statements. ? Sudden problems with walking or balance. ? A sudden, severe headache that is different from past headaches.    Call your doctor now or seek immediate medical care if:    · You feel dizzy and have a fever, headache, or ringing in your ears.     · You have new or increased nausea and vomiting.     · Your dizziness does not go away or comes back.    Watch closely for changes in your health, and be sure to contact your doctor if:    · You do not get better as expected. Where can you learn more? Go to http://fitz-tiffanie.info/. Enter I790 in the search box to learn more about \"Dizziness: Care Instructions. \"  Current as of: June 26, 2019  Content Version: 12.2  © 2464-8509 [employer]. Care instructions adapted under license by [employer] (which disclaims liability or warranty for this information). If you have questions about a medical condition or this instruction, always ask your healthcare professional. [place] [address] any warranty or liability for your use of this information.          Exposure to Sexually Transmitted Infections: Care Instructions  Your Care Instructions  Sexually transmitted infections (STIs) are those diseases spread by sexual contact. There are at least 20 different STIs, including chlamydia, gonorrhea, syphilis, and human immunodeficiency virus (HIV), which causes AIDS. Bacteria-caused STIs can be treated and cured. STIs caused by viruses, such as HIV, can be treated but not cured. Some STIs can reduce a woman's chances of getting pregnant in the future. STIs are spread during sexual contact, such as vaginal intercourse and oral or anal sex. Follow-up care is a key part of your treatment and safety. Be sure to make and go to all appointments, and call your doctor if you are having problems. It's also a good idea to know your test results and keep a list of the medicines you take. How can you care for yourself at home? · Your doctor may have given you a shot of antibiotics. If your doctor prescribed antibiotic pills, take them as directed. Do not stop taking them just because you feel better. You need to take the full course of antibiotics. · Do not have sexual contact while you have symptoms of an STI or are being treated for an STI. · Tell your sex partner (or partners) that he or she will need treatment. · If you are a woman, do not douche. Douching changes the normal balance of bacteria in the vagina and may spread an infection up into your reproductive organs. To prevent exposure to STIs in the future  · Use latex condoms every time you have sex. Use them from the beginning to the end of sexual contact. · Talk to your partner before you have sex. Find out if he or she has or is at risk for any STI. Keep in mind that a person may be able to spread an STI even if he or she does not have symptoms. · Do not have sex if you are being treated for an STI. · Do not have sex with anyone who has symptoms of an STI, such as sores on the genitals or mouth. · Having one sex partner (who does not have STIs and does not have sex with anyone else) is a good way to avoid STIs. When should you call for help?   Call your doctor now or seek immediate medical care if:    · You have new pain in your belly or pelvis.     · You have symptoms of a urinary tract infection. These may include:  ? Pain or burning when you urinate. ? A frequent need to urinate without being able to pass much urine. ? Pain in the flank, which is just below the rib cage and above the waist on either side of the back. ? Blood in your urine. ? A fever.     · You have new or worsening pain or swelling in the scrotum.    Watch closely for changes in your health, and be sure to contact your doctor if:    · You have unusual vaginal bleeding.     · You have a discharge from the vagina or penis.     · You have any new symptoms, such as sores, bumps, rashes, blisters, or warts.     · You have itching, tingling, pain, or burning in the genital or anal area.     · You think you may have an STI. Where can you learn more? Go to http://fitz-tiffanie.info/. Enter Q910 in the search box to learn more about \"Exposure to Sexually Transmitted Infections: Care Instructions. \"  Current as of: September 11, 2018  Content Version: 12.2  © 2193-2844 [employer]. Care instructions adapted under license by [employer] (which disclaims liability or warranty for this information). If you have questions about a medical condition or this instruction, always ask your healthcare professional. [place] [address] any warranty or liability for your use of this information.

## 2019-10-08 NOTE — PROGRESS NOTES
Name and  verified        Chief Complaint   Patient presents with    Mass     Left jaw area. Patient stated chewing is painful. Visited [facility] on 2019 Antibiotic given and she has completed.  Vaginal Discharge     Started on 10/4/2019 patient discomfort while voiding.  Cyst     Vagina patient stated noticed on 10/4/2019       Patient stated last PAP EXAM . Health Maintenance reviewed-discussed with patient. 1. Have you been to the ER, urgent care clinic since your last visit? Hospitalized since your last visit? no    2. Have you seen or consulted any other health care providers outside of the [address] [place] [address] since your last visit? Include any pap smears or colon screening. Yes, see above note. Patient declined flu vaccine.

## 2019-10-18 ENCOUNTER — HOSPITAL ENCOUNTER (OUTPATIENT)
Dept: ULTRASOUND IMAGING | Age: 49
Discharge: HOME | End: 2019-10-18
Attending: FAMILY MEDICINE
Payer: MEDICAID

## 2019-10-18 DIAGNOSIS — R22.1 LOCALIZED SWELLING, MASS AND LUMP, NECK: ICD-10-CM

## 2019-10-18 PROCEDURE — 76536 US EXAM OF HEAD AND NECK: CPT

## 2020-01-16 ENCOUNTER — OFFICE VISIT (OUTPATIENT)
Dept: NEUROLOGY | Age: 50
End: 2020-01-16

## 2020-01-16 VITALS
OXYGEN SATURATION: 97 % | BODY MASS INDEX: 30.02 KG/M2 | SYSTOLIC BLOOD PRESSURE: 134 MMHG | WEIGHT: 159 LBS | RESPIRATION RATE: 12 BRPM | HEIGHT: 61 IN | HEART RATE: 71 BPM | DIASTOLIC BLOOD PRESSURE: 82 MMHG

## 2020-01-16 DIAGNOSIS — G44.209 TENSION VASCULAR HEADACHE: ICD-10-CM

## 2020-01-16 DIAGNOSIS — R42 DIZZINESS AND GIDDINESS: ICD-10-CM

## 2020-01-16 DIAGNOSIS — G56.03 BILATERAL CARPAL TUNNEL SYNDROME: ICD-10-CM

## 2020-01-16 DIAGNOSIS — I65.23 BILATERAL CAROTID ARTERY STENOSIS: ICD-10-CM

## 2020-01-16 DIAGNOSIS — M47.22 CERVICAL RADICULOPATHY DUE TO DEGENERATIVE JOINT DISEASE OF SPINE: ICD-10-CM

## 2020-01-16 DIAGNOSIS — H81.10 VERTIGO, BENIGN PAROXYSMAL, UNSPECIFIED LATERALITY: Primary | ICD-10-CM

## 2020-01-16 RX ORDER — MECLIZINE HYDROCHLORIDE 25 MG/1
TABLET ORAL
COMMUNITY
End: 2021-03-22 | Stop reason: SDUPTHER

## 2020-01-16 NOTE — LETTER
1/16/20 Patient: Clarita Echavarria YOB: 1970 Date of Visit: 1/16/2020 Madelyn Esqueda MD 
[address] [id number] VIA In Basket Dear Madelyn Esqueda MD, Thank you for referring Ms. Clarita Echavarria to [facility] for evaluation. My notes for this consultation are attached. Consult REFERRED BY: 
Phi Dominguez MD 
 
CHIEF COMPLAINT: Vertigo Subjective:  
 
Clarita Echavarria is a 51 y.o. right-handed -American female seen as a new patient to me at the request of Dr. Juan Carlos Senior for evaluation of new problem of progressive vertigo has been persistent since October of this year, and occurring even worse after she had a 2-week cruise in December, and has been having vertigo almost every single day is becoming incapacitating. She initially got some response with meclizine, thinks that that is not working as well now. Patient had thyroid test and B12 levels checked recently that are normal.  She also gets increasing headaches now after the vertigo described as a bifrontal type and bitemporal type of headache that is a vague pressure throbbing sensation that is also increasing in frequency. Patient has had no hearing loss or tinnitus, she has had no focal weakness or sensory loss, and no double vision or blurred vision, no recent trauma head injury fever, meningismus, or any other new medical problems or exposure to medications that could be causing her symptoms. When she turns her head or moves around the vertigo gets worse. She has had no falls or injuries. She is never had any diagnostic testing done, and never seen ear nose and throat physician. Her only other medical problem was vitamin D deficiency and eczema and tubal ligation and D&C. She denies any chest pain palpitations or cardiac symptoms with the vertigo. Patient occasionally will have nausea and vomiting with her vertigo. Patient also has a complaint of numbness in her hands occurs when her hands are rest or at nighttime, and has a Tinel's over both median nerve suggesting carpal tunnel syndrome, and may need an EMG study for that. Past Medical History:  
Diagnosis Date  Contact dermatitis and other eczema, due to unspecified cause  Headache(784.0)  Other screening mammogram 13 [facility]-Normal-repeat one yr Past Surgical History:  
Procedure Laterality Date  HX GYN    
 D&C in   HX GYN    
  X1  
 HX TUBAL LIGATION Family History Problem Relation Age of Onset  Diabetes Mother  Hypertension Mother  Heart Disease Mother  Kidney Disease Mother   
     esrd on dilaysis  Stroke Mother  Heart Disease Father  Diabetes Sister  Hypertension Sister  Stroke Sister  Heart Disease Sister  Hypertension Brother  Diabetes Brother  Heart Disease Brother  No Known Problems Child Social History Tobacco Use  Smoking status: Former Smoker Years: 1.00 Last attempt to quit: 1994 Years since quittin.0  Smokeless tobacco: Never Used  Tobacco comment: 1 pack yr Substance Use Topics  Alcohol use: Not Currently Current Outpatient Medications:  
  meclizine (ANTIVERT) 25 mg tablet, Take  by mouth three (3) times daily as needed for Dizziness. , Disp: , Rfl:  
  ferrous sulfate 325 mg (65 mg iron) tablet, Take 1 Tab by mouth two (2) times a day., Disp: 60 Tab, Rfl: 2 
  ergocalciferol (VITAMIN D2) 50,000 unit capsule, Take 1 Cap by mouth every seven (7) days. , Disp: 4 Cap, Rfl: 11 
  acetaminophen (TYLENOL) 500 mg tablet, Take  by mouth every six (6) hours as needed for Pain., Disp: , Rfl:  
 
 
 
Allergies Allergen Reactions  Percocet [Oxycodone-Acetaminophen] Itching MRI Results (most recent): No results found for this or any previous visit. No results found for this or any previous visit. Review of Systems: A comprehensive review of systems was negative except for: Ears, nose, mouth, throat, and face: positive for Vertigo Gastrointestinal: positive for nausea and vomiting Neurological: positive for dizziness and vertigo Vitals:  
 01/16/20 1010 BP: 134/82 Pulse: 71 Resp: 12 SpO2: 97% Weight: 159 lb (72.1 kg) Height: 5' 1\" (1.549 m) Objective: I 
 
 
NEUROLOGICAL EXAM: 
 
Appearance: The patient is well developed, well nourished, provides a coherent history and is in no acute distress. Mental Status: Oriented to time, place and person, and the president, cognitive function is normal and speech is fluent and no aphasia or dysarthria. Mood and affect appropriate. Cranial Nerves:   Intact visual fields. Fundi are benign, disc are flat, no lesions seen on funduscopy. ELVIS, EOM's full, no nystagmus, no ptosis. Facial sensation is normal. Corneal reflexes are not tested. Facial movement is symmetric. Hearing is normal bilaterally. Palate is midline with normal sternocleidomastoid and trapezius muscles are normal. Tongue is midline. Neck without meningismus or bruits Temporal arteries are not tender or enlarged TMJ areas are not tender on palpation Motor:  5/5 strength in upper and lower proximal and distal muscles. Normal bulk and tone. No fasciculations. Rapid alternating movement is symmetric and intact bilaterally Reflexes:   Deep tendon reflexes 2+/4 and symmetrical. 
No babinski or clonus present Patient had a markedly positive Hallpike Joel maneuver suggesting vestibular disturbance Patient had very mild Tinel's over her median nerves at the wrists bilaterally, suggesting carpal tunnel syndrome Sensory:   Normal to touch, pinprick and vibration and temperature. DSS is intact Gait:  Normal gait for patient's age. Tremor:   No tremor noted.   
Cerebellar:  No abnormal cerebellar signs present on Romberg and tandem testing and finger-nose-finger exam.  
 Neurovascular:  Normal heart sounds and regular rhythm, peripheral pulses intact, and no carotid bruits. Assessment: ICD-10-CM ICD-9-CM 1. Vertigo, benign paroxysmal, unspecified laterality H81.10 386.11 DUPLEX CAROTID BILATERAL  
   MRI BRAIN W WO CONT  
   REFERRAL TO PHYSICAL THERAPY 2. Dizziness and giddiness R42 780.4 DUPLEX CAROTID BILATERAL  
   MRI BRAIN W WO CONT  
   REFERRAL TO PHYSICAL THERAPY 3. Tension vascular headache G44.209 307.81 DUPLEX CAROTID BILATERAL  
   MRI BRAIN W WO CONT  
   REFERRAL TO PHYSICAL THERAPY 4. Bilateral carotid artery stenosis I65.23 433.10 DUPLEX CAROTID BILATERAL  
  433.30 MRI BRAIN W WO CONT  
   REFERRAL TO PHYSICAL THERAPY Active Problems: * No active hospital problems. * 
 
 
Plan:  
 
Patient with vertigo that sounds more vestibular in origin, and has a positive Hallpike Joel maneuver suggesting vestibular dysfunction with a normal neurologic exam, but concern because of increasing headaches associated with the vertigo that she might have a posterior fossa lesion, possible acoustic neuroma, possible brainstem tumor, possible increased intracranial pressure, or other neurologic causes. We will get MRI of the brain with and without contrast to rule out possibility of tumor or structural lesion of the brain. We will send her to vestibular rehab and see if therapy and treatment of Epley's maneuver may help her vertigo and she will continue the Antivert in the interim. Just to be complete we will also check carotid Doppler studies to rule out any type of cerebrovascular insufficiency or vascular cause of her symptoms. She will check my chart for results of her test, call us for results, and we will see her again in 3 to 6 months time or earlier as need be for follow-up depending on results of her test, her clinical course, and response to therapy.  
1 hour spent with patient going over her history, discussing her diagnosis, adjusting her treatment options, and testing needed, and prognosis and treatment of her disease Signed By: Kandis Quesada MD   
 January 16, 2020 CC: Maggie Dorantes MD 
FAX: [phone] This note will not be viewable in [address] If you have questions, please do not hesitate to call me. I look forward to following your patient along with you. Sincerely, Kandis Quesada MD

## 2020-01-16 NOTE — PROGRESS NOTES
Consult  REFERRED BY:  Hortencia Keene MD    CHIEF COMPLAINT: Vertigo      Subjective:     Debra De La O is a 51 y.o. right-handed -American female seen as a new patient to me at the request of Dr. Ida Turpin for evaluation of new problem of progressive vertigo has been persistent since October of this year, and occurring even worse after she had a 2-week cruise in December, and has been having vertigo almost every single day is becoming incapacitating. She initially got some response with meclizine, thinks that that is not working as well now. Patient had thyroid test and B12 levels checked recently that are normal.  She also gets increasing headaches now after the vertigo described as a bifrontal type and bitemporal type of headache that is a vague pressure throbbing sensation that is also increasing in frequency. Patient has had no hearing loss or tinnitus, she has had no focal weakness or sensory loss, and no double vision or blurred vision, no recent trauma head injury fever, meningismus, or any other new medical problems or exposure to medications that could be causing her symptoms. When she turns her head or moves around the vertigo gets worse. She has had no falls or injuries. She is never had any diagnostic testing done, and never seen ear nose and throat physician. Her only other medical problem was vitamin D deficiency and eczema and tubal ligation and D&C. She denies any chest pain palpitations or cardiac symptoms with the vertigo. Patient occasionally will have nausea and vomiting with her vertigo. Patient also has a complaint of numbness in her hands occurs when her hands are rest or at nighttime, and has a Tinel's over both median nerve suggesting carpal tunnel syndrome, and may need an EMG study for that.     Past Medical History:   Diagnosis Date    Contact dermatitis and other eczema, due to unspecified cause     Headache(784.0)     Other screening mammogram 02/26/13 [facility]-Normal-repeat one yr      Past Surgical History:   Procedure Laterality Date    HX GYN      D&C in     HX GYN       X1    HX TUBAL LIGATION       Family History   Problem Relation Age of Onset    Diabetes Mother     Hypertension Mother     Heart Disease Mother     Kidney Disease Mother         esrd on [de-identified]     Stroke Mother     Heart Disease Father     Diabetes Sister     Hypertension Sister     Stroke Sister     Heart Disease Sister     Hypertension Brother     Diabetes Brother     Heart Disease Brother     No Known Problems Child       Social History     Tobacco Use    Smoking status: Former Smoker     Years: 1.00     Last attempt to quit: 1994     Years since quittin.0    Smokeless tobacco: Never Used    Tobacco comment: 1 pack yr   Substance Use Topics    Alcohol use: Not Currently         Current Outpatient Medications:     meclizine (ANTIVERT) 25 mg tablet, Take  by mouth three (3) times daily as needed for Dizziness. , Disp: , Rfl:     ferrous sulfate 325 mg (65 mg iron) tablet, Take 1 Tab by mouth two (2) times a day., Disp: 60 Tab, Rfl: 2    ergocalciferol (VITAMIN D2) 50,000 unit capsule, Take 1 Cap by mouth every seven (7) days. , Disp: 4 Cap, Rfl: 11    acetaminophen (TYLENOL) 500 mg tablet, Take  by mouth every six (6) hours as needed for Pain., Disp: , Rfl:         Allergies   Allergen Reactions    Percocet [Oxycodone-Acetaminophen] Itching      MRI Results (most recent):  No results found for this or any previous visit. No results found for this or any previous visit.   Review of Systems:  A comprehensive review of systems was negative except for: Ears, nose, mouth, throat, and face: positive for Vertigo  Gastrointestinal: positive for nausea and vomiting  Neurological: positive for dizziness and vertigo   Vitals:    20 1010   BP: 134/82   Pulse: 71   Resp: 12   SpO2: 97%   Weight: 159 lb (72.1 kg)   Height: 5' 1\" (1.549 m)     Objective: I      NEUROLOGICAL EXAM:    Appearance: The patient is well developed, well nourished, provides a coherent history and is in no acute distress. Mental Status: Oriented to time, place and person, and the president, cognitive function is normal and speech is fluent and no aphasia or dysarthria. Mood and affect appropriate. Cranial Nerves:   Intact visual fields. Fundi are benign, disc are flat, no lesions seen on funduscopy. ELVIS, EOM's full, no nystagmus, no ptosis. Facial sensation is normal. Corneal reflexes are not tested. Facial movement is symmetric. Hearing is normal bilaterally. Palate is midline with normal sternocleidomastoid and trapezius muscles are normal. Tongue is midline. Neck without meningismus or bruits  Temporal arteries are not tender or enlarged  TMJ areas are not tender on palpation   Motor:  5/5 strength in upper and lower proximal and distal muscles. Normal bulk and tone. No fasciculations. Rapid alternating movement is symmetric and intact bilaterally   Reflexes:   Deep tendon reflexes 2+/4 and symmetrical.  No babinski or clonus present  Patient had a markedly positive Hallpike Joel maneuver suggesting vestibular disturbance  Patient had very mild Tinel's over her median nerves at the wrists bilaterally, suggesting carpal tunnel syndrome   Sensory:   Normal to touch, pinprick and vibration and temperature. DSS is intact   Gait:  Normal gait for patient's age. Tremor:   No tremor noted. Cerebellar:  No abnormal cerebellar signs present on Romberg and tandem testing and finger-nose-finger exam.   Neurovascular:  Normal heart sounds and regular rhythm, peripheral pulses intact, and no carotid bruits. Assessment:       ICD-10-CM ICD-9-CM    1.  Vertigo, benign paroxysmal, unspecified laterality H81.10 386.11 DUPLEX CAROTID BILATERAL      MRI BRAIN W WO CONT      REFERRAL TO PHYSICAL THERAPY   2. Dizziness and giddiness R42 780.4 DUPLEX CAROTID BILATERAL      MRI BRAIN W WO CONT      REFERRAL TO PHYSICAL THERAPY   3. Tension vascular headache G44.209 307.81 DUPLEX CAROTID BILATERAL      MRI BRAIN W WO CONT      REFERRAL TO PHYSICAL THERAPY   4. Bilateral carotid artery stenosis I65.23 433.10 DUPLEX CAROTID BILATERAL     433.30 MRI BRAIN W WO CONT      REFERRAL TO PHYSICAL THERAPY     Active Problems:    * No active hospital problems. *      Plan:     Patient with vertigo that sounds more vestibular in origin, and has a positive Hallpike Joel maneuver suggesting vestibular dysfunction with a normal neurologic exam, but concern because of increasing headaches associated with the vertigo that she might have a posterior fossa lesion, possible acoustic neuroma, possible brainstem tumor, possible increased intracranial pressure, or other neurologic causes. We will get MRI of the brain with and without contrast to rule out possibility of tumor or structural lesion of the brain. We will send her to vestibular rehab and see if therapy and treatment of Epley's maneuver may help her vertigo and she will continue the Antivert in the interim. Just to be complete we will also check carotid Doppler studies to rule out any type of cerebrovascular insufficiency or vascular cause of her symptoms. She will check my chart for results of her test, call us for results, and we will see her again in 3 to 6 months time or earlier as need be for follow-up depending on results of her test, her clinical course, and response to therapy. 1 hour spent with patient going over her history, discussing her diagnosis, adjusting her treatment options, and testing needed, and prognosis and treatment of her disease    Signed By: Sultana Pham MD     January 16, 2020       CC: Mortimer Mouse, MD  FAX: [phone]    This note will not be viewable in [address]

## 2020-01-16 NOTE — PATIENT INSTRUCTIONS
A Healthy Lifestyle: Care Instructions Your Care Instructions A healthy lifestyle can help you feel good, stay at a healthy weight, and have plenty of energy for both work and play. A healthy lifestyle is something you can share with your whole family. A healthy lifestyle also can lower your risk for serious health problems, such as high blood pressure, heart disease, and diabetes. You can follow a few steps listed below to improve your health and the health of your family. Follow-up care is a key part of your treatment and safety. Be sure to make and go to all appointments, and call your doctor if you are having problems. It's also a good idea to know your test results and keep a list of the medicines you take. How can you care for yourself at home? · Do not eat too much sugar, fat, or fast foods. You can still have dessert and treats now and then. The goal is moderation. · Start small to improve your eating habits. Pay attention to portion sizes, drink less juice and soda pop, and eat more fruits and vegetables. ? Eat a healthy amount of food. A 3-ounce serving of meat, for example, is about the size of a deck of cards. Fill the rest of your plate with vegetables and whole grains. ? Limit the amount of soda and sports drinks you have every day. Drink more water when you are thirsty. ? Eat at least 5 servings of fruits and vegetables every day. It may seem like a lot, but it is not hard to reach this goal. A serving or helping is 1 piece of fruit, 1 cup of vegetables, or 2 cups of leafy, raw vegetables. Have an apple or some carrot sticks as an afternoon snack instead of a candy bar. Try to have fruits and/or vegetables at every meal. 
· Make exercise part of your daily routine. You may want to start with simple activities, such as walking, bicycling, or slow swimming. Try to be active 30 to 60 minutes every day.  You do not need to do all 30 to 60 PO appt scheduled 11/7 11:00A   minutes all at once. For example, you can exercise 3 times a day for 10 or 20 minutes. Moderate exercise is safe for most people, but it is always a good idea to talk to your doctor before starting an exercise program. 
· Keep moving. Alva Parody the lawn, work in the garden, or [employer]. Take the stairs instead of the elevator at work. · If you smoke, quit. People who smoke have an increased risk for heart attack, stroke, cancer, and other lung illnesses. Quitting is hard, but there are ways to boost your chance of quitting tobacco for good. ? Use nicotine gum, patches, or lozenges. ? Ask your doctor about stop-smoking programs and medicines. ? Keep trying. In addition to reducing your risk of diseases in the future, you will notice some benefits soon after you stop using tobacco. If you have shortness of breath or asthma symptoms, they will likely get better within a few weeks after you quit. · Limit how much alcohol you drink. Moderate amounts of alcohol (up to 2 drinks a day for men, 1 drink a day for women) are okay. But drinking too much can lead to liver problems, high blood pressure, and other health problems. Family health If you have a family, there are many things you can do together to improve your health. · Eat meals together as a family as often as possible. · Eat healthy foods. This includes fruits, vegetables, lean meats and dairy, and whole grains. · Include your family in your fitness plan. Most people think of activities such as jogging or tennis as the way to fitness, but there are many ways you and your family can be more active. Anything that makes you breathe hard and gets your heart pumping is exercise. Here are some tips: 
? Walk to do errands or to take your child to school or the bus. 
? Go for a family bike ride after dinner instead of watching TV. Where can you learn more? Go to http://fitz-tiffanie.info/. Enter U169 in the search box to learn more about \"A Healthy Lifestyle: Care Instructions. \" Current as of: May 28, 2019 Content Version: 12.2 © 6246-8933 [employer]. Care instructions adapted under license by [employer] (which disclaims liability or warranty for this information). If you have questions about a medical condition or this instruction, always ask your healthcare professional. [address] any warranty or liability for your use of this information. Office Policies 
 
o Phone calls/patient messages: Please allow up to 24 hours for someone in the office to contact you about your call or message. Be mindful your provider may be out of the office or your message may require further review. We encourage you to use [employer] for your messages as this is a faster, more efficient way to communicate with our office 
 
o Medication Refills: 
Prescription medications require up to 48 business hours to process. We encourage you to use [employer] for your refills. For controlled medications: Please allow up to 72 business hours to process. Certain medications may require you to  a written prescription at our office. NO narcotic/controlled medications will be prescribed after 4pm Monday through Friday or on weekends 
 
o Form/Paperwork Completion: We ask that you allow 7-14 business days. You may also download your forms to [employer] to have your doctor print off.

## 2020-01-27 ENCOUNTER — HOSPITAL ENCOUNTER (OUTPATIENT)
Dept: PHYSICAL THERAPY | Age: 50
Discharge: HOME | End: 2020-01-27
Payer: MEDICAID

## 2020-01-27 PROCEDURE — 97161 PT EVAL LOW COMPLEX 20 MIN: CPT | Performed by: PHYSICAL THERAPIST

## 2020-01-27 NOTE — PROGRESS NOTES
[address] [address] (MOB IV), [address] [facility] [place]  Phone: [phone] Fax: [phone]    Plan of Care/Statement of Necessity for Physical Therapy Services  2-15    Patient name: Jose Del Rio  : 1970  Provider#: [id number]  Referral source: Shereen Hamman, MD      Medical/Treatment Diagnosis: Vertigo [R42]     Prior Hospitalization: see medical history     Comorbidities: headaches  Prior Level of Function: 20 min of exercise 1-2x/wk  Medications: Verified on Patient Summary List    Start of Care: 20      Onset Date: chronic       The Plan of Care and following information is based on the information from the initial evaluation. Assessment/ key information: The patient presents with a chief complaint of chronic bouts of vertigo (room spinning, dizziness, lightheadedness) that is correlated with changing positions, but it has become worse since a recent two week cruise in 2019. She is now averaging three instances per week. She has poor vestibular input, as seen from a MCTSIB test, and positive Mitchell-Hallpike test for right BPPV. She will benefit from guided therapeutic interventions to include Epley's and De La Rosa-Daroff maneuvers to develop a safe and effective self treatment program for her vertigo.     Evaluation Complexity History MEDIUM  Complexity : 1-2 comorbidities / personal factors will impact the outcome/ POC ; Examination LOW Complexity : 1-2 Standardized tests and measures addressing body structure, function, activity limitation and / or participation in recreation  ;Presentation LOW Complexity : Stable, uncomplicated  ;Clinical Decision Making MEDIUM Complexity : FOTO score of 26-74  Overall Complexity Rating: LOW     Problem List: impaired gait/ balance, decrease ADL/ functional abilitiies, decrease activity tolerance and decrease transfer abilities   Treatment Plan may include any combination of the following: Therapeutic exercise, Neuromuscular re-education, Gait/balance training, Manual therapy, Patient education, Self Care training and Functional mobility training  Patient / Family readiness to learn indicated by: asking questions, trying to perform skills and interest  Persons(s) to be included in education: patient (P)  Barriers to Learning/Limitations: None  Patient Goal (s): better way to THE [facility]  Patient Self Reported Health Status: good  Rehabilitation Potential: good    Short Term Goals: To be accomplished in 2 treatments:                         1.) The patient will be independent with their HEP consistently for at least one week. Long Term Goals: To be accomplished in 5 treatments:                         1.) The patient will not have any bouts of vertigo for at least one week                         2.) The patient will improve her MCTSIB from 95/120 to 120/120 to show improvement in vestibular input. 3.) The patient will improve their FOTO score from 56 to at least 65 to show improvements in functional mobility. Frequency / Duration: Patient to be seen 1 times per week for 5 treatments. Patient/ Caregiver education and instruction: self care, activity modification and exercises    [x]  Plan of care has been reviewed with VINCENT Lowry, PT 1/27/2020     ________________________________________________________________________    I certify that the above Therapy Services are being furnished while the patient is under my care. I agree with the treatment plan and certify that this therapy is necessary.     [de-identified] Signature:____________________  Date:____________Time: _________

## 2020-01-27 NOTE — PROGRESS NOTES
PT INITIAL EVALUATION NOTE 2-15    Patient Name: Dylan Meyers  Date:2020  : 1970  [x]  Patient  Verified  Payor: Yfn Noble / Plan: [address] [place] [address] / Product Type: Managed Care Medicaid /    In time: 3:10pm  Out time: 4:00pm  Total Treatment Time (min): 50  Visit #: 1     Treatment Area: Vertigo [R42]    SUBJECTIVE  Pain Level (0-10 scale): 1 (0-8/10)  Any medication changes, allergies to medications, adverse drug reactions, diagnosis change, or new procedure performed?: [] No    [x] Yes (see summary sheet for update)  Subjective: The patient reports that she had vertigo starting 17 years ago, but recently it's become worse, almost every month. She then had a two weeks flare up in 2019, then went on a cruise in December from the  to . She hasn't been taking Meclizine for the last 5 days due to side effects. She will feel it a lot when she gets up from laying down. It will vary by the side she rolls onto. She will get spinning, nausea, light headedness, bouncing, etc. She's had about 3 bouts over the past week.          OBJECTIVE/EXAMINATION  Observations:  Posture: scapular protraction bilaterally, forward head  Gait: WFL  Functional Mobility: WFL  Palpation: nt  Cervical AROM: WFL  Strength:  UE: Grossly WFL  LE: Grossly WFL  Vision:   Spontaneous Nystagmus: neg   Gaze Hold Nystagmus: nt   Occulomotor control (H pattern): neg   Smooth Pursuit (H pattern): neg   Convergence: neg   Saccades (shift eyes bw 2 targets): neg   Visual Acuity: nt    Gaze stabilization (hold eyes on stable target while moving head): neg   Skew Eye Deviation: nt  Cerebellar Function:    Finger to nose: neg   Pointing/ past pointing: neg   Rapid forearm supination/pronation:neg  Vertebral Artery Test: neg (modified)  BPPV:  Osborne Hallpike: right positive for canalithiasis    Balance Tests:    MCTSIB: 1= 30s; 2= 30s; 4= 30s; 5= 5s hand moved, very unsteady       7 min Manual Therapy:  Right Epley's maneuver   Rationale: correct positional vertigo  to improve the patients ability to perform daily activities.            With   [x] TE   [] TA   [] neuro   [] other: Patient Education: [x] Review HEP    [x] Progressed/Changed HEP based on:   [x] positioning   [x] body mechanics   [] transfers   [] heat/ice application    [] other:        Other Objective/Functional Measures: FOTO=56    Pain Level (0-10 scale) post treatment: 0      ASSESSMENT:      [x]  See Plan of [facility], PT 1/27/2020

## 2020-02-05 ENCOUNTER — HOSPITAL ENCOUNTER (OUTPATIENT)
Dept: PHYSICAL THERAPY | Age: 50
Discharge: HOME | End: 2020-02-05
Payer: MEDICAID

## 2020-02-05 PROCEDURE — 97112 NEUROMUSCULAR REEDUCATION: CPT | Performed by: PHYSICAL THERAPIST

## 2020-02-05 NOTE — PROGRESS NOTES
PT DAILY TREATMENT NOTE 2-15    Patient Name: Pavithra Sorenson  Date:2020  : 1970  [x]  Patient  Verified  Payor: Rohan Mcneill / [place] [facility] / Product Type: Managed Care Medicaid /    In time:4:00pm  Out time: 4:28pm  Total Treatment Time (min): 28  Visit #:  2    Treatment Area: Vertigo [R42]    SUBJECTIVE  Pain Level (0-10 scale): 0  Any medication changes, allergies to medications, adverse drug reactions, diagnosis change, or new procedure performed?: [x] No    [] Yes (see summary sheet for update)  Subjective functional status/changes:   [] No changes reported  The patient reports that she's been feeling great. OBJECTIVE     28 min Neuromuscular Re-education:  []  See flow sheet :   Rationale: improve coordination, improve balance and increase proprioception  to improve the patients ability to perform daily activities. With   [x] TE   [] TA   [] neuro   [] other: Patient Education: [x] Review HEP    [x] Progressed/Changed HEP based on:   [x] positioning   [x] body mechanics   [] transfers   [] heat/ice application    [] other:      Other Objective/Functional Measures: FOTO= 97 (56 at eval)    MCTSIB: 120/120 (95/120 at eval)     Pain Level (0-10 scale) post treatment: 0    ASSESSMENT/Changes in Function:   The patient has progressed well and MET goals and will be discharged. []  See Plan of Care  []  See progress note/recertification  [x]  See Discharge Summary         Progress towards goals / Updated goals: The patient has MET goals. PLAN  []  Upgrade activities as tolerated     []  Continue plan of care  []  Update interventions per flow sheet       [x]  Discharge due to: Pt. Has MET goals.    []  Other:_      Rachel Estrada, PT 2020

## 2020-02-05 NOTE — ANCILLARY DISCHARGE INSTRUCTIONS
[facility] [address] [facility] [facility] (MOB IV), [address] [place] Phone: [phone] Fax: [phone] Medicaid Discharge Summary  2-15 Patient name: Kahti Nesbitt  : 1970  Provider#: [id number] Referral source: Carlos Terry MD     
Medical/Treatment Diagnosis: Vertigo [R42] Prior Hospitalization: see medical history Comorbidities: See Plan of Care Prior Level of Function:See Plan of Care Medications: Verified on Patient Summary List 
 
Start of Care: 20      Onset Date: chronic Visits from Start of Care: 2    Missed Visits: 0 Reporting Period : 20 to 20 ASSESSMENT/SUMMARY OF CARE: The patient has progressed well reporting no BPPV symptoms over the past 1-2 weeks. She had a negative Joel-Hallpike test today and improved vestibular input as seen from a MCTSIB test. She has an HEP for self treatment and management of her symptoms and will be discharged today. Short Term Goals: To be accomplished in 2 treatments: 
                       6.) The patient will be independent with their HEP consistently for at least one week. - MET Long Term Goals: To be accomplished in 5 treatments: 
                       1.) The patient will not have any bouts of vertigo for at least one week- MET 
                       6.) The patient will improve her MCTSIB from 95/120 to 120/120 to show improvement in vestibular input.- MET 
                       9.) The patient will improve their FOTO score from 56 to at least 65 to show improvements in functional mobility.- MET (97 today) RECOMMENDATIONS: 
[x]Discontinue therapy: [x]Patient has reached or is progressing toward set goals []Patient is non-compliant or has abdicated 
    []Due to lack of appreciable progress towards set goals []Other Eva Franklin, PT 2020  
 
 
______________________________________________________________________ NOTE TO PHYSICIAN:  Please complete the following and fax to: [facility]: Fax: [phone]. Retain this original for your records. If you are unable to process this request in 24 hours, please contact our office. [de-identified] Signature:____________________  Date:____________Time:_________

## 2020-02-08 ENCOUNTER — HOSPITAL ENCOUNTER (OUTPATIENT)
Dept: MRI IMAGING | Age: 50
Discharge: HOME | End: 2020-02-08
Attending: PSYCHIATRY & NEUROLOGY
Payer: MEDICAID

## 2020-02-08 DIAGNOSIS — G44.209 TENSION VASCULAR HEADACHE: ICD-10-CM

## 2020-02-08 DIAGNOSIS — R42 DIZZINESS AND GIDDINESS: ICD-10-CM

## 2020-02-08 DIAGNOSIS — I65.23 BILATERAL CAROTID ARTERY STENOSIS: ICD-10-CM

## 2020-02-08 DIAGNOSIS — H81.10 VERTIGO, BENIGN PAROXYSMAL, UNSPECIFIED LATERALITY: ICD-10-CM

## 2020-02-08 PROCEDURE — A9575 INJ GADOTERATE MEGLUMI 0.1ML: HCPCS | Performed by: PSYCHIATRY & NEUROLOGY

## 2020-02-08 PROCEDURE — 74011250636 HC RX REV CODE- 250/636: Performed by: PSYCHIATRY & NEUROLOGY

## 2020-02-08 PROCEDURE — 70553 MRI BRAIN STEM W/O & W/DYE: CPT

## 2020-02-08 RX ORDER — GADOTERATE MEGLUMINE 376.9 MG/ML
15 INJECTION INTRAVENOUS
Status: COMPLETED | OUTPATIENT
Start: 2020-02-08 | End: 2020-02-08

## 2020-02-08 RX ADMIN — GADOTERATE MEGLUMINE 14 ML: 376.9 INJECTION INTRAVENOUS at 16:38

## 2020-02-10 ENCOUNTER — DOCUMENTATION ONLY (OUTPATIENT)
Dept: NEUROLOGY | Age: 50
End: 2020-02-10

## 2020-02-10 NOTE — PROGRESS NOTES
I called the patient, no answer, left message and told her MRI scan was normal, call back if any question

## 2020-02-12 ENCOUNTER — APPOINTMENT (OUTPATIENT)
Dept: PHYSICAL THERAPY | Age: 50
End: 2020-02-12
Payer: MEDICAID

## 2020-02-12 ENCOUNTER — OFFICE VISIT (OUTPATIENT)
Dept: NEUROLOGY | Age: 50
End: 2020-02-12

## 2020-02-12 VITALS
HEART RATE: 75 BPM | WEIGHT: 159 LBS | OXYGEN SATURATION: 98 % | SYSTOLIC BLOOD PRESSURE: 137 MMHG | BODY MASS INDEX: 30.02 KG/M2 | HEIGHT: 61 IN | DIASTOLIC BLOOD PRESSURE: 89 MMHG

## 2020-02-12 DIAGNOSIS — I65.23 BILATERAL CAROTID ARTERY STENOSIS: ICD-10-CM

## 2020-02-12 DIAGNOSIS — H81.10 VERTIGO, BENIGN PAROXYSMAL, UNSPECIFIED LATERALITY: Primary | ICD-10-CM

## 2020-02-12 DIAGNOSIS — G56.03 BILATERAL CARPAL TUNNEL SYNDROME: ICD-10-CM

## 2020-02-12 DIAGNOSIS — M47.22 CERVICAL RADICULOPATHY DUE TO DEGENERATIVE JOINT DISEASE OF SPINE: ICD-10-CM

## 2020-02-12 DIAGNOSIS — R42 DIZZINESS AND GIDDINESS: ICD-10-CM

## 2020-02-12 DIAGNOSIS — G44.209 TENSION VASCULAR HEADACHE: ICD-10-CM

## 2020-02-12 NOTE — PROCEDURES
ELECTRODIAGNOSTIC REPORT      Test Date:  2020    Patient: Alexandria Hahn : 1970 Physician: Erika Knight M.D. Sex: Female  < Ref Phys: Erika Knight M.D. Technician: Brett Matias    Patient History: Patient with increasing numbness in both hands, neck pain, EMG to rule out cervical radiculopathy, rule out entrapment neuropathy, rule out other neuromuscular disease    Neuro Exam: Patient has normal reflexes throughout in all extremities, and normal muscle bulk and tone, no Babinski or clonus present, and patient has objectively normal sensory examination to all modalities. Cranial nerves II through XII intact and mental status is normal gait and station is normal.  Patient does move very slowly however due to her arthritis. EMG report: This study shows electrophysiologic evidence of an essentially normal EMG and nerve conduction velocity study of both upper extremities, showing no clear evidence of cervical radiculopathy, motor radiculopathy, entrapment neuropathy, or other neuromuscular disease. Clinical correlation recommended, correlation with imaging modalities and metabolic studies may also be of further diagnostic benefit if clinically indicated. EMG & NCV Findings:  Evaluation of the left median motor nerve showed normal distal onset latency (3.2 ms) and normal amplitude (10.0 mV). The right median motor nerve showed normal distal onset latency (3.7 ms), normal amplitude (9.5 mV), and normal conduction velocity (Elbow-Wrist, 66 m/s). The left ulnar motor and the right ulnar motor nerves showed normal distal onset latency (L2.7, R2.4 ms), normal amplitude (L11.1, R9.5 mV), decreased conduction velocity (Wrist-Abd Dig Minimi, L30, R33 m/s), normal conduction velocity (B Elbow-Wrist, L63, R67 m/s), and normal conduction velocity (A Elbow-B Elbow, L56, R77 m/s).   The left median sensory and the right median sensory nerves showed normal distal onset latency (L2.8, R2.5 ms), normal distal peak latency (L3.3, R3.3 ms), and normal amplitude (L22.7, R20.6 µV). The left radial sensory, the right radial sensory, the left ulnar sensory, and the right ulnar sensory nerves showed normal distal peak latency (L2.2, R2.0, L2.9, R3.2 ms) and normal amplitude (L27.6, R49.1, L42.9, R16.5 µV). The left median/ulnar (palm) comparison nerve showed normal distal peak latency (Median Palm, 1.8 ms), normal amplitude (Median Palm, 52.2 µV), normal distal peak latency (Ulnar Palm, 1.6 ms), normal amplitude (Ulnar Palm, 25.2 µV), and normal peak latency difference (Median Palm-Ulnar Palm, 0.2 ms). The right median/ulnar (palm) comparison nerve showed normal distal peak latency (Median Palm, 1.9 ms), reduced amplitude (Median Palm, 37.9 µV), normal distal peak latency (Ulnar Palm, 1.7 ms), normal amplitude (Ulnar Palm, 11.0 µV), and normal peak latency difference (Median Palm-Ulnar Palm, 0.2 ms). All left vs. right side differences were within normal limits.                 __________________  Mike Herring M.D.        Nerve Conduction Studies  Anti Sensory Summary Table     Stim Site NR Onset (ms) Peak (ms) O-P Amp (µV) Norm Peak (ms) Norm O-P Amp Site1 Site2 Dist (cm) Norm Mendez (m/s)   Left Median Anti Sensory (2nd Digit)   Wrist    2.8 3.3 22.7 <4 >11 Wrist 2nd Digit 14.0    Right Median Anti Sensory (2nd Digit)   Wrist    2.5 3.3 20.6 <4 >11 Wrist 2nd Digit 14.0    Left Radial Anti Sensory (Base 1st Digit)   Wrist    1.8 2.2 27.6 <2.8 7 Wrist Base 1st Digit 10.0    Right Radial Anti Sensory (Base 1st Digit)   Wrist    1.6 2.0 49.1 <2.8 7 Wrist Base 1st Digit 10.0    Left Ulnar Anti Sensory (5th Digit)   Wrist    2.3 2.9 42.9 <4.0 >10 Wrist 5th Digit 14.0    Right Ulnar Anti Sensory (5th Digit)   Wrist    2.6 3.2 16.5 <4.0 >10 Wrist 5th Digit 14.0      Motor Summary Table     Stim Site NR Onset (ms) Norm Onset (ms) O-P Amp (mV) Norm O-P Amp P-T Amp (mV) Site1 Site2 Dist (cm) Mendez (m/s)   Left Median Motor (Abd Poll Brev)   Wrist    3.2 <4.5 10.0 >4.1  Wrist Abd Poll Brev 8.0 25   Elbow    6.7  9.2   Elbow Wrist 0.0    Right Median Motor (Abd Poll Brev)   Wrist    3.7 <4.5 9.5 >4.1  Wrist Abd Poll Brev 8.0 22   Elbow    7.5  9.5   Elbow Wrist 25.0 66   Left Ulnar Motor (Abd Dig Minimi)   Wrist    2.7 <3.1 11.1 >7.0  Wrist Abd Dig Minimi 8.0 30   B Elbow    5.9  10.5   B Elbow Wrist 20.0 63   A Elbow    7.7  10.1   A Elbow B Elbow 10.0 56   Right Ulnar Motor (Abd Dig Minimi)   Wrist    2.4 <3.1 9.5 >7.0  Wrist Abd Dig Minimi 8.0 33   B Elbow    5.7  8.6   B Elbow Wrist 22.0 67   A Elbow    7.0  8.2   A Elbow B Elbow 10.0 77     Comparison Summary Table     Stim Site NR Peak (ms) P-T Amp (µV) Site1 Site2 Dist (cm) Delta-P (ms)   Left Median/Ulnar Palm Comparison (Wrist)   Median Palm    1.8 62.3 Median Palm Ulnar Palm 8.0 0.2   Ulnar Palm    1.6 36.3       Right Median/Ulnar Palm Comparison (Wrist)   Median Palm    1.9 41.1 Median Palm Ulnar Palm 8.0 0.2   Ulnar Palm    1.7 11.1         EMG     Side Muscle Nerve Root Ins Act Fibs Psw Recrt Duration Amp Poly Comment   Right Biceps Musculocut C5-6 Nml Nml Nml Nml Nml Nml Nml    Left Biceps Musculocut C5-6 Nml Nml Nml Nml Nml Nml Nml    Left Triceps Radial C6-7-8 Nml Nml Nml Nml Nml Nml Nml    Right Triceps Radial C6-7-8 Nml Nml Nml Nml Nml Nml Nml    Left Lower Cerv Parasp Rami C7,T1 Nml Nml Nml Nml Nml Nml Nml    Right Lower Cerv Parasp Rami C7,T1 Nml Nml Nml Nml Nml Nml Nml    Left FlexPolLong Median (Ant Int) C7-8 Nml Nml Nml Nml Nml Nml Nml    Right FlexPolLong Median (Ant Int) C7-8 Nml Nml Nml Nml Nml Nml Nml    Left Abd Poll Brev Median C8-T1 Nml Nml Nml Nml Nml Nml Nml    Right 1stDorInt Ulnar C8-T1 Nml Nml Nml Nml Nml Nml Nml    Right Abd Poll Brev Median C8-T1 Nml Nml Nml Nml Nml Nml Nml    Left 1stDorInt Ulnar C8-T1 Nml Nml Nml Nml Nml Nml Nml          Waveforms:

## 2020-02-12 NOTE — LETTER
2/12/20 Patient: Nancy Terrell YOB: 1970 Date of Visit: 2/12/2020 Rehan Dhaliwal MD 
[address] [place] [id number] VIA In Basket Dear Rehan Dhaliwal MD, Thank you for referring Ms. Nancy Terrell to [address] [place] [address] for evaluation. My notes for this consultation are attached. Patient had a normal EMG and nerve conduction velocity study of both upper extremities showing no clear evidence of neuropathy, carpal tunnel syndrome or motor radiculopathy. If you have questions, please do not hesitate to call me. I look forward to following your patient along with you. Sincerely, Bennett Cali MD

## 2020-02-13 NOTE — PROGRESS NOTES
Patient had a normal EMG and nerve conduction velocity study of both upper extremities showing no clear evidence of neuropathy, carpal tunnel syndrome or motor radiculopathy.

## 2020-02-19 ENCOUNTER — APPOINTMENT (OUTPATIENT)
Dept: PHYSICAL THERAPY | Age: 50
End: 2020-02-19
Payer: MEDICAID

## 2020-02-26 ENCOUNTER — APPOINTMENT (OUTPATIENT)
Dept: PHYSICAL THERAPY | Age: 50
End: 2020-02-26
Payer: MEDICAID

## 2020-09-18 ENCOUNTER — VIRTUAL VISIT (OUTPATIENT)
Dept: FAMILY MEDICINE CLINIC | Age: 50
End: 2020-09-18
Payer: MEDICAID

## 2020-09-18 DIAGNOSIS — Z71.89 ADVICE GIVEN ABOUT COVID-19 VIRUS INFECTION: ICD-10-CM

## 2020-09-18 DIAGNOSIS — R21 RASH: Primary | ICD-10-CM

## 2020-09-18 DIAGNOSIS — E55.9 VITAMIN D DEFICIENCY: ICD-10-CM

## 2020-09-18 PROCEDURE — 99213 OFFICE O/P EST LOW 20 MIN: CPT | Performed by: FAMILY MEDICINE

## 2020-09-18 RX ORDER — ERGOCALCIFEROL 1.25 MG/1
50000 CAPSULE ORAL
Qty: 4 CAP | Refills: 4 | Status: SHIPPED | OUTPATIENT
Start: 2020-09-18 | End: 2021-03-09

## 2020-09-18 RX ORDER — FLUOCINONIDE 0.5 MG/G
CREAM TOPICAL 2 TIMES DAILY
Qty: 60 G | Refills: 5 | Status: SHIPPED | OUTPATIENT
Start: 2020-09-18 | End: 2022-02-16 | Stop reason: SDUPTHER

## 2020-09-18 NOTE — PROGRESS NOTES
Chief Complaint   Patient presents with    Rash     1. Have you been to the ER, urgent care clinic since your last visit? Hospitalized since your last visit? No    2. Have you seen or consulted any other health care providers outside of the [address] [place] [address] since your last visit? Include any pap smears or colon screening. No    Call placed to pt. Verified patient with two type of identifiers.  c/o rash under both breasts and palm of left hand,

## 2020-09-18 NOTE — PATIENT INSTRUCTIONS
Rash: Care Instructions Your Care Instructions A rash is any irritation or inflammation of the skin. Rashes have many possible causes, including allergy, infection, illness, heat, and emotional stress. Follow-up care is a key part of your treatment and safety. Be sure to make and go to all appointments, and call your doctor if you are having problems. It's also a good idea to know your test results and keep a list of the medicines you take. How can you care for yourself at home? · Wash the area with water only. Soap can make dryness and itching worse. Pat dry. · Put cold, wet cloths on the rash to reduce itching. · Keep cool, and stay out of the sun. · Leave the rash open to the air as much of the time as possible. · Sometimes petroleum jelly (Vaseline) can help relieve the discomfort caused by a rash. A moisturizing lotion, such as Cetaphil, also may help. Calamine lotion may help for rashes caused by contact with something (such as a plant or soap) that irritated the skin. Use it 3 or 4 times a day. · If your doctor prescribed a cream, use it as directed. If your doctor prescribed medicine, take it exactly as directed. · If your rash itches so badly that it interferes with your normal activities, take an over-the-counter antihistamine, such as diphenhydramine (Benadryl) or loratadine (Claritin). Read and follow all instructions on the label. When should you call for help? Call your doctor now or seek immediate medical care if: 
  · You have signs of infection, such as: 
? Increased pain, swelling, warmth, or redness. ? Red streaks leading from the area. ? Pus draining from the area. ? A fever.  
  · You have joint pain along with the rash. Watch closely for changes in your health, and be sure to contact your doctor if: 
  · Your rash is changing or getting worse. For example, call if you have pain along with the rash, the rash is spreading, or you have new blisters.   · You do not get better after 1 week. Where can you learn more? Go to http://fitz-tiffanie.info/ Enter M532 in the search box to learn more about \"Rash: Care Instructions. \" Current as of: July 2, 2020               Content Version: 12.6 © 6882-7051 [employer]. Care instructions adapted under license by [employer] (which disclaims liability or warranty for this information). If you have questions about a medical condition or this instruction, always ask your healthcare professional. [address] any warranty or liability for your use of this information.

## 2020-09-18 NOTE — PROGRESS NOTES
HISTORY OF PRESENT ILLNESS  Katie Green is a 51 y.o. female. HPI    Pt's main concerns were provided on virtual visit, a telemed format,  pt is w/ comorbid medical history and unaware of been exposed to covid-19 individual,  pt has no fever no cough no dyspnea, no ha, not dizzy, nl smell nl taste, no N/V/D, no body ache. Rash of the rt breast and the palm of the rt hand, Started few weeks ago not better tried alcohol washing and OTC antibiotic ointments, does not tingles and not pain full, states that is expanding red, and not  swelled up, whitish patches rounds, with itchiness    Current Outpatient Medications   Medication Sig Dispense Refill    meclizine (ANTIVERT) 25 mg tablet Take  by mouth three (3) times daily as needed for Dizziness.  ferrous sulfate 325 mg (65 mg iron) tablet Take 1 Tab by mouth two (2) times a day. 60 Tab 2    ergocalciferol (VITAMIN D2) 50,000 unit capsule Take 1 Cap by mouth every seven (7) days. 4 Cap 11    acetaminophen (TYLENOL) 500 mg tablet Take  by mouth every six (6) hours as needed for Pain.        Allergies   Allergen Reactions    Percocet [Oxycodone-Acetaminophen] Itching     Past Medical History:   Diagnosis Date    Contact dermatitis and other eczema, due to unspecified cause     Headache(784.0)     [facility]     Past Surgical History:   Procedure Laterality Date    HX GYN      D&C in     HX GYN       X1    HX TUBAL LIGATION       Family History   Problem Relation Age of Onset    Diabetes Mother     Hypertension Mother     Heart Disease Mother     Kidney Disease Mother         esrd on [de-identified]     Stroke Mother     Heart Disease Father     Diabetes Sister     Hypertension Sister     Stroke Sister     Heart Disease Sister     Hypertension Brother     Diabetes Brother     Heart Disease Brother     No Known Problems Child      Social History     Tobacco Use    Smoking status: Former Smoker     Years: 1.00     Last attempt to quit: 1994     Years since quittin.7    Smokeless tobacco: Never Used    Tobacco comment: 1 pack yr   Substance Use Topics    Alcohol use: Not Currently      Lab Results   Component Value Date/Time    Hemoglobin A1c 5.4 2019 12:41 PM    Hemoglobin A1c 5.6 2013 10:18 AM    Hemoglobin A1c 5.8 (H) 2012 09:43 AM    Glucose 80 2019 12:41 PM    LDL, calculated 116 (H) 2019 12:41 PM    Creatinine 0.82 2019 12:41 PM         Review of Systems   Constitutional: Negative for chills and fever. HENT: Negative for congestion and nosebleeds. Eyes: Negative for blurred vision and pain. Respiratory: Negative for cough, shortness of breath and wheezing. Cardiovascular: Negative for chest pain and leg swelling. Gastrointestinal: Negative for constipation, diarrhea, nausea and vomiting. Genitourinary: Negative for dysuria and frequency. Musculoskeletal: Negative for joint pain and myalgias. Skin: Positive for itching and rash. Neurological: Negative for dizziness, loss of consciousness and headaches. Psychiatric/Behavioral: Negative for depression. The patient is not nervous/anxious and does not have insomnia. Physical Exam  Constitutional:       Appearance: She is obese. She is not ill-appearing or toxic-appearing. HENT:      Head: Normocephalic and atraumatic. Mouth/Throat:      Mouth: Mucous membranes are moist.   Skin:     Findings: Erythema and rash present. Neurological:      Mental Status: She is alert and oriented to person, place, and time. Psychiatric:         Mood and Affect: Mood normal.         Behavior: Behavior normal.         Thought Content: Thought content normal.         Judgment: Judgment normal.         ASSESSMENT and PLAN  Diagnoses and all orders for this visit:    1. Rash    2. Advice given about COVID-19 virus infection    3.  Vitamin D deficiency  -     ergocalciferol (Vitamin D2) 1,250 mcg (50,000 unit) capsule; Take 1 Cap by mouth every seven (7) days. Other orders  -     fluocinoNIDE (LIDEX) 0.05 % topical cream; Apply  to affected area two (2) times a day. Hydration w/ skin emolients, po otc antihistamine for relief of itching, reduce exposures to house dust mites, try to identify allergic triggers      Patient advised to have the mask on most of the time, social distance and handwashing avoid crowded area pursuant to the emergency declaration under the [address]  and [place] [address] [facility] authority and the [employer] and [employer] Act, this Virtual Visit was conducted, with patient's consent, to reduce the patient's risk of exposure to COVID-19 and provide continuity of care for an established patient  Services were provided through a Video synchronous discussion virtually to substitute for in-person appointment.

## 2020-10-07 ENCOUNTER — HOSPITAL ENCOUNTER (OUTPATIENT)
Dept: MAMMOGRAPHY | Age: 50
Discharge: HOME | End: 2020-10-07
Attending: FAMILY MEDICINE
Payer: MEDICAID

## 2020-10-07 DIAGNOSIS — Z12.31 VISIT FOR SCREENING MAMMOGRAM: ICD-10-CM

## 2020-10-07 PROCEDURE — 77067 SCR MAMMO BI INCL CAD: CPT

## 2020-10-19 DIAGNOSIS — B35.4 TINEA CORPORIS: Primary | ICD-10-CM

## 2020-10-19 RX ORDER — CICLOPIROX OLAMINE 7.7 MG/G
CREAM TOPICAL 2 TIMES DAILY
Qty: 30 G | Refills: 1 | Status: SHIPPED | OUTPATIENT
Start: 2020-10-19 | End: 2021-01-05

## 2020-12-11 DIAGNOSIS — R42 SPINNING SENSATION: Primary | ICD-10-CM

## 2020-12-11 RX ORDER — SCOLOPAMINE TRANSDERMAL SYSTEM 1 MG/1
1 PATCH, EXTENDED RELEASE TRANSDERMAL
Qty: 4 PATCH | Refills: 0 | Status: SHIPPED | OUTPATIENT
Start: 2020-12-11 | End: 2022-04-18

## 2020-12-16 ENCOUNTER — DOCUMENTATION ONLY (OUTPATIENT)
Dept: FAMILY MEDICINE CLINIC | Age: 50
End: 2020-12-16

## 2021-01-04 DIAGNOSIS — B35.4 TINEA CORPORIS: ICD-10-CM

## 2021-01-05 RX ORDER — CICLOPIROX OLAMINE 7.7 MG/G
CREAM TOPICAL
Qty: 30 G | Refills: 1 | Status: SHIPPED | OUTPATIENT
Start: 2021-01-05 | End: 2021-11-12 | Stop reason: SDUPTHER

## 2021-01-07 ENCOUNTER — OFFICE VISIT (OUTPATIENT)
Dept: FAMILY MEDICINE CLINIC | Age: 51
End: 2021-01-07
Payer: MEDICAID

## 2021-01-07 VITALS
BODY MASS INDEX: 29.83 KG/M2 | HEIGHT: 61 IN | DIASTOLIC BLOOD PRESSURE: 95 MMHG | HEART RATE: 79 BPM | OXYGEN SATURATION: 98 % | RESPIRATION RATE: 18 BRPM | WEIGHT: 158 LBS | TEMPERATURE: 97.3 F | SYSTOLIC BLOOD PRESSURE: 143 MMHG

## 2021-01-07 DIAGNOSIS — Z02.89 ENCOUNTER TO OBTAIN EXCUSE FROM WORK: Primary | ICD-10-CM

## 2021-01-07 DIAGNOSIS — L30.9 DERMATITIS: ICD-10-CM

## 2021-01-07 DIAGNOSIS — D50.8 OTHER IRON DEFICIENCY ANEMIA: ICD-10-CM

## 2021-01-07 PROCEDURE — 99213 OFFICE O/P EST LOW 20 MIN: CPT | Performed by: FAMILY MEDICINE

## 2021-01-07 RX ORDER — CALCIPOTRIENE 50 UG/G
CREAM TOPICAL 2 TIMES DAILY
Qty: 60 G | Refills: 0 | Status: SHIPPED | OUTPATIENT
Start: 2021-01-07 | End: 2021-05-19 | Stop reason: SDUPTHER

## 2021-01-07 RX ORDER — AMMONIUM LACTATE 12 G/100G
CREAM TOPICAL 2 TIMES DAILY
Qty: 140 G | Refills: 1 | Status: SHIPPED | OUTPATIENT
Start: 2021-01-07 | End: 2022-04-18

## 2021-01-07 RX ORDER — CLOBETASOL PROPIONATE 0.5 MG/G
CREAM TOPICAL 2 TIMES DAILY
Qty: 45 G | Refills: 1 | Status: SHIPPED | OUTPATIENT
Start: 2021-01-07

## 2021-01-07 RX ORDER — METHYLPREDNISOLONE 4 MG/1
TABLET ORAL
Qty: 1 DOSE PACK | Refills: 0 | Status: SHIPPED | OUTPATIENT
Start: 2021-01-07 | End: 2021-05-19 | Stop reason: ALTCHOICE

## 2021-01-07 RX ORDER — LANOLIN ALCOHOL/MO/W.PET/CERES
325 CREAM (GRAM) TOPICAL 2 TIMES DAILY
Qty: 60 TAB | Refills: 6 | Status: SHIPPED | OUTPATIENT
Start: 2021-01-07 | End: 2022-05-31 | Stop reason: SDUPTHER

## 2021-01-07 RX ORDER — MEDROXYPROGESTERONE ACETATE 10 MG/1
TABLET ORAL
COMMUNITY
Start: 2020-11-13

## 2021-01-07 NOTE — PROGRESS NOTES
Chief Complaint   Patient presents with    Rash     F/U for rash on L hand. Rash now on araceli hands. 1. Have you been to the ER, urgent care clinic since your last visit? Hospitalized since your last visit? No    2. Have you seen or consulted any other health care providers outside of the [address] [place] [address] since your last visit? Include any pap smears or colon screening.  No

## 2021-01-13 NOTE — PROGRESS NOTES
HISTORY OF PRESENT ILLNESS  Fanny Penn is a 53 y.o. female. Rash of the hands   Started few weeks ago not better tried alcohol washing and OTC antibiotic ointments, dosenot tingles and not pain full, states that is notexpanding red, and not  swelled up, whitish patches rounds, with itchiness           Current Outpatient Medications   Medication Sig Dispense Refill    medroxyPROGESTERone (PROVERA) 10 mg tablet TAKE 1 TAB BY MOUTH DAILY ON DAYS 5 THROUGH 15 OF MONTH TO REGULATE MENSES      ferrous sulfate 325 mg (65 mg iron) tablet Take 1 Tab by mouth two (2) times a day. 60 Tab 6    methylPREDNISolone (MEDROL DOSEPACK) 4 mg tablet As directed for 6 days one package 1 Dose Pack 0    clobetasoL (TEMOVATE) 0.05 % topical cream Apply  to affected area two (2) times a day. 45 g 1    ammonium lactate (AMLACTIN) 12 % topical cream Apply  to affected area two (2) times a day. rub in to affected area well 140 g 1    calcipotriene (DOVONEX) 0.005 % topical cream Apply  to affected area two (2) times a day. 60 g 0    coal tar (OXIPOR VHC) 5 % lotion Apply  to affected area two (2) times a day. 56 mL 0    ciclopirox (LOPROX) 0.77 % topical cream APPLY TO AFFECTED AREA TWICE A DAY 30 g 1    itraconazole 200 mg tab Take 1 Tab by mouth daily. 7 Tab 0    fluocinoNIDE (LIDEX) 0.05 % topical cream Apply  to affected area two (2) times a day. 60 g 5    ergocalciferol (Vitamin D2) 1,250 mcg (50,000 unit) capsule Take 1 Cap by mouth every seven (7) days. 4 Cap 4    meclizine (ANTIVERT) 25 mg tablet Take  by mouth three (3) times daily as needed for Dizziness.  acetaminophen (TYLENOL) 500 mg tablet Take  by mouth every six (6) hours as needed for Pain.  scopolamine (TRANSDERM-SCOP) 1 mg over 3 days pt3d 1 Patch by TransDERmal route every seventy-two (72) hours.  4 Patch 0     Allergies   Allergen Reactions    Percocet [Oxycodone-Acetaminophen] Itching     Past Medical History:   Diagnosis Date    Contact dermatitis and other eczema, due to unspecified cause     Headache(784.0)     [facility]     Past Surgical History:   Procedure Laterality Date    HX GYN      D&C in     HX GYN       X1    HX TUBAL LIGATION       Family History   Problem Relation Age of Onset    Diabetes Mother     Hypertension Mother     Heart Disease Mother     Kidney Disease Mother         esrd on [de-identified]     Stroke Mother     Heart Disease Father     Diabetes Sister     Hypertension Sister     Stroke Sister     Heart Disease Sister     Hypertension Brother     Diabetes Brother     Heart Disease Brother     No Known Problems Child      Social History     Tobacco Use    Smoking status: Former Smoker     Years: 1.00     Quit date: 1994     Years since quittin.0    Smokeless tobacco: Never Used    Tobacco comment: 1 pack yr   Substance Use Topics    Alcohol use: Not Currently      Lab Results   Component Value Date/Time    WBC 5.1 2019 12:41 PM    HGB 11.7 2019 12:41 PM    HCT 35.3 2019 12:41 PM    PLATELET 569  12:41 PM    MCV 86 2019 12:41 PM     Lab Results   Component Value Date/Time    TSH 1.090 2019 12:41 PM         Review of Systems   Constitutional: Negative for chills, fever and malaise/fatigue. HENT: Negative for nosebleeds. Eyes: Negative for pain. Respiratory: Negative for cough and wheezing. Cardiovascular: Negative for chest pain and leg swelling. Gastrointestinal: Negative for constipation, diarrhea and nausea. Genitourinary: Negative for frequency. Musculoskeletal: Negative for joint pain and myalgias. Skin: Positive for itching. Negative for rash. Neurological: Negative for loss of consciousness and headaches. Endo/Heme/Allergies: Does not bruise/bleed easily. Psychiatric/Behavioral: Negative for depression. The patient is not nervous/anxious and does not have insomnia.     All other systems reviewed and are negative. Physical Exam  Vitals signs and nursing note reviewed. Constitutional:       Appearance: She is well-developed. HENT:      Head: Normocephalic and atraumatic. Eyes:      Conjunctiva/sclera: Conjunctivae normal.      Pupils: Pupils are equal, round, and reactive to light. Neck:      Thyroid: No thyromegaly. Vascular: No JVD. Cardiovascular:      Rate and Rhythm: Normal rate and regular rhythm. Heart sounds: Normal heart sounds. No murmur. No friction rub. No gallop. Pulmonary:      Effort: Pulmonary effort is normal. No respiratory distress. Breath sounds: Normal breath sounds. No stridor. No wheezing or rales. Abdominal:      General: Bowel sounds are normal. There is no distension. Palpations: Abdomen is soft. There is no mass. Tenderness: There is no abdominal tenderness. Musculoskeletal: Normal range of motion. General: No tenderness. Lymphadenopathy:      Cervical: No cervical adenopathy. Skin:     General: Skin is warm. Coloration: Skin is pale. Findings: Abscess, erythema, lesion and rash present. Rash is pustular. Neurological:      Mental Status: She is alert and oriented to person, place, and time. Cranial Nerves: No cranial nerve deficit. Deep Tendon Reflexes: Reflexes are normal and symmetric. Psychiatric:         Behavior: Behavior normal.         ASSESSMENT and PLAN  Diagnoses and all orders for this visit:    1. Encounter to obtain excuse from work    2. Other iron deficiency anemia  -     ferrous sulfate 325 mg (65 mg iron) tablet; Take 1 Tab by mouth two (2) times a day. 3. Dermatitis  -     REFERRAL TO DERMATOLOGY  -     SED RATE (ESR); Future  -     C REACTIVE PROTEIN, QT; Future  -     VERONICA COMPREHENSIVE PLUS PANEL; Future    Other orders  -     methylPREDNISolone (MEDROL DOSEPACK) 4 mg tablet;  As directed for 6 days one package  -     clobetasoL (TEMOVATE) 0.05 % topical cream; Apply  to affected area two (2) times a day. -     ammonium lactate (AMLACTIN) 12 % topical cream; Apply  to affected area two (2) times a day. rub in to affected area well  -     calcipotriene (DOVONEX) 0.005 % topical cream; Apply  to affected area two (2) times a day. -     coal tar (OXIPOR VHC) 5 % lotion; Apply  to affected area two (2) times a day. Follow-up and Dispositions    · Return if symptoms worsen or fail to improve.

## 2021-01-15 LAB
CENTROMERE B AB SER-ACNC: <0.2 AI (ref 0–0.9)
CHROMATIN AB SERPL-ACNC: <0.2 AI (ref 0–0.9)
CRP SERPL-MCNC: <1 MG/L (ref 0–10)
DSDNA AB SER-ACNC: <1 IU/ML (ref 0–9)
ENA JO1 AB SER-ACNC: <0.2 AI (ref 0–0.9)
ENA RNP AB SER-ACNC: <0.2 AI (ref 0–0.9)
ENA SCL70 AB SER-ACNC: <0.2 AI (ref 0–0.9)
ENA SM AB SER-ACNC: <0.2 AI (ref 0–0.9)
ENA SM+RNP AB SER-ACNC: <0.2 AI (ref 0–0.9)
ENA SS-A AB SER-ACNC: <0.2 AI (ref 0–0.9)
ENA SS-B AB SER-ACNC: <0.2 AI (ref 0–0.9)
ERYTHROCYTE [SEDIMENTATION RATE] IN BLOOD BY WESTERGREN METHOD: 28 MM/HR (ref 0–32)
RIBOSOMAL P AB SER-ACNC: <0.2 AI (ref 0–0.9)
SEE BELOW:, 164879: NORMAL

## 2021-03-08 DIAGNOSIS — E55.9 VITAMIN D DEFICIENCY: ICD-10-CM

## 2021-03-09 RX ORDER — ERGOCALCIFEROL 1.25 MG/1
CAPSULE ORAL
Qty: 4 CAP | Refills: 4 | Status: SHIPPED | OUTPATIENT
Start: 2021-03-09 | End: 2021-09-27

## 2021-03-22 NOTE — TELEPHONE ENCOUNTER
Patient is calling for a refill on her:meclizine (ANTIVERT) 25 mg tablet. Please call @[phone].

## 2021-03-24 RX ORDER — MECLIZINE HYDROCHLORIDE 25 MG/1
25 TABLET ORAL
Qty: 30 TAB | Refills: 0 | Status: SHIPPED | OUTPATIENT
Start: 2021-03-24 | End: 2022-02-16 | Stop reason: SDUPTHER

## 2021-05-19 ENCOUNTER — VIRTUAL VISIT (OUTPATIENT)
Dept: FAMILY MEDICINE CLINIC | Age: 51
End: 2021-05-19
Payer: MEDICAID

## 2021-05-19 DIAGNOSIS — L30.9 DERMATITIS: Primary | ICD-10-CM

## 2021-05-19 DIAGNOSIS — Z71.89 ADVICE GIVEN ABOUT COVID-19 VIRUS INFECTION: ICD-10-CM

## 2021-05-19 PROCEDURE — 99213 OFFICE O/P EST LOW 20 MIN: CPT | Performed by: FAMILY MEDICINE

## 2021-05-19 RX ORDER — CALCIPOTRIENE 50 UG/G
CREAM TOPICAL 2 TIMES DAILY
Qty: 60 G | Refills: 1 | Status: SHIPPED | OUTPATIENT
Start: 2021-05-19 | End: 2022-04-18

## 2021-05-19 NOTE — PROGRESS NOTES
Chief Complaint   Patient presents with    Elevated Blood Pressure     1. Have you been to the ER, urgent care clinic since your last visit? Hospitalized since your last visit? No    2. Have you seen or consulted any other health care providers outside of the [address] [place] [address] since your last visit? Include any pap smears or colon screening. Yes When: 5/12/21 Where: gyn Reason for visit: well woman exam      Call placed to pt. Verified patient with two type of identifiers.   seen by gyn on 5/12/21,  Blood pressure during visit  Was 188/109, advised to follow up with pcp

## 2021-05-19 NOTE — PROGRESS NOTES
HISTORY OF PRESENT ILLNESS  Didi Fonseca is a 53 y.o. female. Pt's main concerns were provided on virtual visit, a telemed format,  pt is w/ comorbid medical history and unaware of been exposed to covid-19 individual, Pt Have been staying at home for couple of wks,  pt has no fever no cough no dyspnea, no ha, not dizzy, nl smell nl taste, no N/V/D, no body ache. HTN  Today pt present for Bp check and the patient stating that so far there has having had the low salt diet and try to the best of pt's knowledge to avoid smoked meats, the patient has been active life style and does do the daily walking, in addition pt states that patien does obtain the bp at home few times a week and the average of 172/103 at Ob/gyn, 135/90, 121/82 at home , today the pt denies Chest Pain, has no legs swelling no lightheadedness,the pat has not been feeling anxious, and  Has not been feeling stressed out, otherwise feeling better since the last visit  Patient states that that rash has not gone away she has tried multiple topical steroidal treatment is not going away the last treatment was the past requesting a refill and requesting to be seen by dermatologist    Current Outpatient Medications   Medication Sig Dispense Refill    meclizine (ANTIVERT) 25 mg tablet Take 1 Tab by mouth three (3) times daily as needed for Dizziness. 30 Tab 0    ergocalciferol (ERGOCALCIFEROL) 1,250 mcg (50,000 unit) capsule TAKE 1 CAPSULE BY MOUTH ONE TIME PER WEEK 4 Cap 4    medroxyPROGESTERone (PROVERA) 10 mg tablet TAKE 1 TAB BY MOUTH DAILY ON DAYS 5 THROUGH 15 OF MONTH TO REGULATE MENSES      calcipotriene (DOVONEX) 0.005 % topical cream Apply  to affected area two (2) times a day. 60 g 0    acetaminophen (TYLENOL) 500 mg tablet Take  by mouth every six (6) hours as needed for Pain.  ferrous sulfate 325 mg (65 mg iron) tablet Take 1 Tab by mouth two (2) times a day. (Patient taking differently: Take 325 mg by mouth Daily (before breakfast). ) 60 Tab 6    clobetasoL (TEMOVATE) 0.05 % topical cream Apply  to affected area two (2) times a day. (Patient not taking: Reported on 2021) 45 g 1    ammonium lactate (AMLACTIN) 12 % topical cream Apply  to affected area two (2) times a day. rub in to affected area well (Patient taking differently: Apply  to affected area two (2) times a day. rub in to affected area well as needed) 140 g 1    coal tar (OXIPOR VHC) 5 % lotion Apply  to affected area two (2) times a day. (Patient not taking: Reported on 2021) 56 mL 0    ciclopirox (LOPROX) 0.77 % topical cream APPLY TO AFFECTED AREA TWICE A DAY (Patient not taking: Reported on 2021) 30 g 1    scopolamine (TRANSDERM-SCOP) 1 mg over 3 days pt3d 1 Patch by TransDERmal route every seventy-two (72) hours. (Patient not taking: Reported on 2021) 4 Patch 0    fluocinoNIDE (LIDEX) 0.05 % topical cream Apply  to affected area two (2) times a day.  (Patient not taking: Reported on 2021) 60 g 5     Allergies   Allergen Reactions    Percocet [Oxycodone-Acetaminophen] Itching     Past Medical History:   Diagnosis Date    Contact dermatitis and other eczema, due to unspecified cause     Headache(784.0)     Other screening mammogram 13    [facility]-Normal-repeat one yr     Past Surgical History:   Procedure Laterality Date    HX GYN      D&C in     HX GYN       X1    HX TUBAL LIGATION       Family History   Problem Relation Age of Onset    Diabetes Mother     Hypertension Mother     Heart Disease Mother     Kidney Disease Mother         esrd on [de-identified]     Stroke Mother     Heart Disease Father     Diabetes Sister     Hypertension Sister     Stroke Sister     Heart Disease Sister     Hypertension Brother     Diabetes Brother     Heart Disease Brother     No Known Problems Child      Social History     Tobacco Use    Smoking status: Former Smoker     Years: 1.00     Quit date: 1994     Years since quittin.3    Smokeless tobacco: Never Used    Tobacco comment: 1 pack yr   Substance Use Topics    Alcohol use: Not Currently      Lab Results   Component Value Date/Time    WBC 5.1 07/19/2019 12:41 PM    HGB 11.7 07/19/2019 12:41 PM    HCT 35.3 07/19/2019 12:41 PM    PLATELET 685 67/98/1991 12:41 PM    MCV 86 07/19/2019 12:41 PM     Lab Results   Component Value Date/Time    TSH 1.090 07/19/2019 12:41 PM         Review of Systems   Constitutional: Negative for chills, fever and malaise/fatigue. HENT: Negative for nosebleeds. Eyes: Negative for pain. Respiratory: Negative for cough and wheezing. Cardiovascular: Negative for chest pain and leg swelling. Gastrointestinal: Negative for constipation, diarrhea and nausea. Genitourinary: Negative for frequency. Musculoskeletal: Negative for joint pain and myalgias. Skin: Positive for itching and rash. Neurological: Negative for loss of consciousness and headaches. Endo/Heme/Allergies: Does not bruise/bleed easily. Psychiatric/Behavioral: Negative for depression. The patient is not nervous/anxious and does not have insomnia. All other systems reviewed and are negative. Physical Exam  Constitutional:       Appearance: She is not ill-appearing or toxic-appearing. HENT:      Head: Normocephalic and atraumatic. Mouth/Throat:      Mouth: Mucous membranes are moist.   Neurological:      Mental Status: She is alert and oriented to person, place, and time. Psychiatric:         Mood and Affect: Mood normal.         Behavior: Behavior normal.         Thought Content: Thought content normal.         Judgment: Judgment normal.         ASSESSMENT and PLAN  Diagnoses and all orders for this visit:    1. Dermatitis  -     calcipotriene (DOVONEX) 0.005 % topical cream; Apply  to affected area two (2) times a day.     2. Advice given about COVID-19 virus infection  -     calcipotriene (DOVONEX) 0.005 % topical cream; Apply  to affected area two (2) times a day.        HTN controlled, no need of bp meds at this time,  Discussed sodium restriction, high k rich diet,  maintaining ideal body weight and regular exercise program such as daily walking 30 min perday 4-5 times per week,  compliance advised, was told to call back for rfs or of any concern,   pt was told to obtain bp daily if bp >150/90 or ,90/60 pt was told to call for further advise pt agreed    Patient advised to have the mask on most of the time, social distance and handwashing avoid crowded area pursuant to the emergency declaration under the [address] and [address] authority and the [employer] Act, this Virtual Visit was conducted, with patient's consent, to reduce the patient's risk of exposure to COVID-19 and provide continuity of care for an established patient  Services were provided through a Video synchronous discussion virtually to substitute for in-person appointment.

## 2021-07-21 ENCOUNTER — VIRTUAL VISIT (OUTPATIENT)
Dept: FAMILY MEDICINE CLINIC | Age: 51
End: 2021-07-21
Payer: MEDICAID

## 2021-07-21 DIAGNOSIS — K42.0 UMBILICAL HERNIA WITH OBSTRUCTION, WITHOUT GANGRENE: Primary | ICD-10-CM

## 2021-07-21 PROCEDURE — 99213 OFFICE O/P EST LOW 20 MIN: CPT | Performed by: FAMILY MEDICINE

## 2021-07-21 NOTE — PROGRESS NOTES
1. Have you been to the ER, urgent care clinic since your last visit? Hospitalized since your last visit? No    2. Have you seen or consulted any other health care providers outside of the [address] [place] [address] since your last visit? Include any pap smears or colon screening. Yes [facility] gynecology       Chief Complaint   Patient presents with    Cyst     Pt c/o knot on her stomach x 2 weeks, states it is usually at the top of her navel, but it rotates and it is now at the bottom. Denies pain. Patient identity verified with two types of identifiers.

## 2021-07-21 NOTE — PROGRESS NOTES
HISTORY OF PRESENT ILLNESS  Sergio Ragsdale is a 53 y.o. female. Patient present with a complaint of knot, above the umblicus, no hx of abd procedures excpt B/L BTL  area state that the swelling fluctuates sometimes is gone sometimes it comes back sometimes is painful other time the patient does not notice it patient currently does work out with strenuous activity and is sexually active in addition patient is currently working and  does a lot of heavy lifting, able to do her job, Lump on the RTside no pain at this time also patient states that there is not constipation episode, does safe sex denies any discharge stating that it is common in family the patient like to get it fixed if it is possible otherwise denies any other complaint,    No n/ v/d/constipation    Current Outpatient Medications   Medication Sig Dispense Refill    meclizine (ANTIVERT) 25 mg tablet Take 1 Tab by mouth three (3) times daily as needed for Dizziness. 30 Tab 0    ergocalciferol (ERGOCALCIFEROL) 1,250 mcg (50,000 unit) capsule TAKE 1 CAPSULE BY MOUTH ONE TIME PER WEEK 4 Cap 4    medroxyPROGESTERone (PROVERA) 10 mg tablet TAKE 1 TAB BY MOUTH DAILY ON DAYS 5 THROUGH 15 OF MONTH TO REGULATE MENSES      calcipotriene (DOVONEX) 0.005 % topical cream Apply  to affected area two (2) times a day. (Patient not taking: Reported on 7/21/2021) 60 g 1    ferrous sulfate 325 mg (65 mg iron) tablet Take 1 Tab by mouth two (2) times a day. (Patient not taking: Reported on 7/21/2021) 60 Tab 6    clobetasoL (TEMOVATE) 0.05 % topical cream Apply  to affected area two (2) times a day. (Patient not taking: Reported on 5/19/2021) 45 g 1    ammonium lactate (AMLACTIN) 12 % topical cream Apply  to affected area two (2) times a day. rub in to affected area well (Patient not taking: Reported on 7/21/2021) 140 g 1    coal tar (OXIPOR VHC) 5 % lotion Apply  to affected area two (2) times a day.  (Patient not taking: Reported on 5/19/2021) 56 mL 0    ciclopirox (LOPROX) 0.77 % topical cream APPLY TO AFFECTED AREA TWICE A DAY (Patient not taking: Reported on 2021) 30 g 1    scopolamine (TRANSDERM-SCOP) 1 mg over 3 days pt3d 1 Patch by TransDERmal route every seventy-two (72) hours. (Patient not taking: Reported on 2021) 4 Patch 0    fluocinoNIDE (LIDEX) 0.05 % topical cream Apply  to affected area two (2) times a day. (Patient not taking: Reported on 2021) 60 g 5    acetaminophen (TYLENOL) 500 mg tablet Take  by mouth every six (6) hours as needed for Pain.  (Patient not taking: Reported on 2021)       Allergies   Allergen Reactions    Percocet [Oxycodone-Acetaminophen] Itching     Past Medical History:   Diagnosis Date    Contact dermatitis and other eczema, due to unspecified cause     Headache(784.0)     Other screening mammogram 13    [facility]-Normal-repeat one yr     Past Surgical History:   Procedure Laterality Date    HX GYN      D&C in     HX GYN       X1    HX TUBAL LIGATION       Family History   Problem Relation Age of Onset    Diabetes Mother     Hypertension Mother     Heart Disease Mother     Kidney Disease Mother         esrd on [de-identified]     Stroke Mother     Heart Disease Father     Diabetes Sister     Hypertension Sister     Stroke Sister     Heart Disease Sister     Hypertension Brother     Diabetes Brother     Heart Disease Brother     No Known Problems Child      Social History     Tobacco Use    Smoking status: Former Smoker     Years: 1.00     Quit date: 1994     Years since quittin.5    Smokeless tobacco: Never Used    Tobacco comment: 1 pack yr   Substance Use Topics    Alcohol use: Not Currently      Lab Results   Component Value Date/Time    WBC 5.1 2019 12:41 PM    HGB 11.7 2019 12:41 PM    HCT 35.3 2019 12:41 PM    PLATELET 394  12:41 PM    MCV 86 2019 12:41 PM     Lab Results   Component Value Date/Time    GFR est non-AA 85 2019 12:41 PM GFR est AA 99 07/19/2019 12:41 PM    Creatinine 0.82 07/19/2019 12:41 PM    BUN 6 07/19/2019 12:41 PM    Sodium 138 07/19/2019 12:41 PM    Potassium 4.7 07/19/2019 12:41 PM    Chloride 104 07/19/2019 12:41 PM    CO2 22 07/19/2019 12:41 PM        Review of Systems   Constitutional: Positive for malaise/fatigue. Negative for chills and fever. HENT: Negative for nosebleeds. Eyes: Negative for pain. Respiratory: Negative for cough and wheezing. Cardiovascular: Negative for chest pain and leg swelling. Gastrointestinal: Negative for constipation, diarrhea and nausea. Genitourinary: Negative for frequency. Musculoskeletal: Negative for joint pain and myalgias. Skin: Negative for rash. Neurological: Negative for loss of consciousness and headaches. Endo/Heme/Allergies: Does not bruise/bleed easily. Psychiatric/Behavioral: Negative for depression. The patient is not nervous/anxious and does not have insomnia. All other systems reviewed and are negative. Physical Exam  Constitutional:       Appearance: She is obese. She is not ill-appearing or toxic-appearing. HENT:      Head: Normocephalic and atraumatic. Mouth/Throat:      Mouth: Mucous membranes are moist.   Neurological:      Mental Status: She is alert and oriented to person, place, and time. Psychiatric:         Mood and Affect: Mood normal.         Behavior: Behavior normal.         Thought Content: Thought content normal.         Judgment: Judgment normal.         ASSESSMENT and PLAN  Diagnoses and all orders for this visit:    1.  Umbilical hernia with obstruction, without gangrene  -     US ABD COMP; Future    decrease heavy lifting and puchin, staty compliant with stool softner such as colace 100 mg one tab tid w/ meals, metamucil powder one cup twice daily, avoid fatty, fast and fried foods, donot miss meals, small meals and more frequent avoid late dinner avoid overeating, increase po fluid intake daily, compliance advised RTC if worsen        Patient advised to have the mask on most of the time, social distance and handwashing avoid crowded area pursuant to the emergency declaration under the [address] and [place] [address] waiver authority and the [employer] Act, this Virtual Visit was conducted, with patient's consent, to reduce the patient's risk of exposure to COVID-19 and provide continuity of care for an established patient  Services were provided through a Video synchronous discussion virtually to substitute for in-person appointment.

## 2021-08-24 ENCOUNTER — HOSPITAL ENCOUNTER (OUTPATIENT)
Dept: ULTRASOUND IMAGING | Age: 51
Discharge: HOME | End: 2021-08-24
Attending: FAMILY MEDICINE
Payer: MEDICAID

## 2021-08-24 DIAGNOSIS — K42.0 UMBILICAL HERNIA WITH OBSTRUCTION, WITHOUT GANGRENE: ICD-10-CM

## 2021-08-24 PROCEDURE — 76705 ECHO EXAM OF ABDOMEN: CPT

## 2021-09-27 DIAGNOSIS — E55.9 VITAMIN D DEFICIENCY: ICD-10-CM

## 2021-09-27 RX ORDER — ERGOCALCIFEROL 1.25 MG/1
CAPSULE ORAL
Qty: 4 CAPSULE | Refills: 4 | Status: SHIPPED | OUTPATIENT
Start: 2021-09-27 | End: 2022-04-15

## 2021-10-22 ENCOUNTER — OFFICE VISIT (OUTPATIENT)
Dept: FAMILY MEDICINE CLINIC | Age: 51
End: 2021-10-22
Payer: MEDICAID

## 2021-10-22 VITALS — HEIGHT: 62 IN | BODY MASS INDEX: 28.78 KG/M2 | WEIGHT: 156.4 LBS | RESPIRATION RATE: 16 BRPM

## 2021-10-22 DIAGNOSIS — R53.83 FATIGUE, UNSPECIFIED TYPE: Primary | ICD-10-CM

## 2021-10-22 DIAGNOSIS — Z71.89 ADVICE GIVEN ABOUT COVID-19 VIRUS INFECTION: ICD-10-CM

## 2021-10-22 DIAGNOSIS — K21.9 GASTROESOPHAGEAL REFLUX DISEASE WITHOUT ESOPHAGITIS: ICD-10-CM

## 2021-10-22 DIAGNOSIS — B35.3 TINEA PEDIS OF RIGHT FOOT: ICD-10-CM

## 2021-10-22 PROCEDURE — 99214 OFFICE O/P EST MOD 30 MIN: CPT | Performed by: FAMILY MEDICINE

## 2021-10-22 RX ORDER — PANTOPRAZOLE SODIUM 40 MG/1
40 TABLET, DELAYED RELEASE ORAL DAILY
Qty: 30 TABLET | Refills: 3 | Status: SHIPPED | OUTPATIENT
Start: 2021-10-22 | End: 2022-04-18

## 2021-10-22 RX ORDER — SERTRALINE HYDROCHLORIDE 25 MG/1
25 TABLET, FILM COATED ORAL DAILY
Qty: 30 TABLET | Refills: 1 | Status: SHIPPED | OUTPATIENT
Start: 2021-10-22 | End: 2022-02-16 | Stop reason: SDDI

## 2021-10-22 RX ORDER — CICLOPIROX OLAMINE 7.7 MG/G
CREAM TOPICAL 2 TIMES DAILY
Qty: 30 G | Refills: 3 | Status: SHIPPED | OUTPATIENT
Start: 2021-10-22 | End: 2021-11-12 | Stop reason: ALTCHOICE

## 2021-10-22 NOTE — PROGRESS NOTES
Chief Complaint   Patient presents with    Foot Pain     burning sensation     1. Have you been to the ER, urgent care clinic since your last visit? Hospitalized since your last visit? No    2. Have you seen or consulted any other health care providers outside of the [address] [place] [address] since your last visit? Include any pap smears or colon screening. No    Verified patient with two type of identifiers. c/o burning sensation to top of right foot,  Hurts to walk x 3 weeks.  Denies injury  Declines flu vaccine

## 2021-10-22 NOTE — PROGRESS NOTES
HISTORY OF PRESENT ILLNESS  Johnie Fierro is a 53 y.o. female, Today's Covid vaccinated Pt main concerns were provided in the clinic,    Pt has been trying to be very covid [place]ab[place],Acid Reflux    Patient states that pt has been dealing with this discomfort for almost <2 years, has not been taking the PPI  pt states that is getting some heart burn since if the patient does not take any over-the-counter anti-gas medications,  in addition, states that the discomfort worsen by fatty/spicy  Foods, and if the patient eats late or if the patient overeats and denies hematchezia or hematemesis, patient never had a previous colonoscopy,     Rash of the rt foot  Started few weeks ago not better tried alcohol washing and OTC antibiotic ointments, dosenot tingles and not pain full, states that is notexpanding red, and not  swelled up, whitish patches rounds, with itchiness    Patient presents stating that has been feeling tired and fatigue does not go away has been feeling like this for a while,  W/ decreased libido does not use any alcoholic beverages no illicit drug no cigarette patient states that has not been under a lot of stress,   Currently on no oral multivitamin daily,  at this time denies any other complaint,     Patient present with the c/o daily fatigue and a lot of nighly snoring, has not done any recent sleep test for nighttime for Snoring, stating that the problem with snoring continues on while asleep,  has been witnessed by the family member, today with lack of energy and daily fatigue and some kicking,  feeling tired during the day, the pt's condition isnot getting better,      Current Outpatient Medications   Medication Sig Dispense Refill    ergocalciferol (ERGOCALCIFEROL) 1,250 mcg (50,000 unit) capsule TAKE 1 CAPSULE BY MOUTH ONE TIME PER WEEK 4 Capsule 4    meclizine (ANTIVERT) 25 mg tablet Take 1 Tab by mouth three (3) times daily as needed for Dizziness.  30 Tab 0    medroxyPROGESTERone (PROVERA) 10 mg tablet TAKE 1 TAB BY MOUTH DAILY ON DAYS 5 THROUGH 15 OF MONTH TO REGULATE MENSES      ferrous sulfate 325 mg (65 mg iron) tablet Take 1 Tab by mouth two (2) times a day. 60 Tab 6    fluocinoNIDE (LIDEX) 0.05 % topical cream Apply  to affected area two (2) times a day. 60 g 5    acetaminophen (TYLENOL) 500 mg tablet Take  by mouth every six (6) hours as needed for Pain.  calcipotriene (DOVONEX) 0.005 % topical cream Apply  to affected area two (2) times a day. (Patient not taking: Reported on 2021) 60 g 1    clobetasoL (TEMOVATE) 0.05 % topical cream Apply  to affected area two (2) times a day. (Patient not taking: Reported on 2021) 45 g 1    ammonium lactate (AMLACTIN) 12 % topical cream Apply  to affected area two (2) times a day. rub in to affected area well (Patient not taking: Reported on 2021) 140 g 1    coal tar (OXIPOR VHC) 5 % lotion Apply  to affected area two (2) times a day. (Patient not taking: Reported on 2021) 56 mL 0    ciclopirox (LOPROX) 0.77 % topical cream APPLY TO AFFECTED AREA TWICE A DAY (Patient not taking: Reported on 2021) 30 g 1    scopolamine (TRANSDERM-SCOP) 1 mg over 3 days pt3d 1 Patch by TransDERmal route every seventy-two (72) hours.  (Patient not taking: Reported on 2021) 4 Patch 0     Allergies   Allergen Reactions    Percocet [Oxycodone-Acetaminophen] Itching     Past Medical History:   Diagnosis Date    Contact dermatitis and other eczema, due to unspecified cause     Headache(784.0)     [facility]     Past Surgical History:   Procedure Laterality Date    HX GYN      D&C in     HX GYN       X1    HX TUBAL LIGATION       Family History   Problem Relation Age of Onset    Diabetes Mother     Hypertension Mother     Heart Disease Mother     Kidney Disease Mother         esrd on [de-identified]     Stroke Mother     Heart Disease Father     Diabetes Sister     Hypertension Sister     Stroke Sister     Heart Disease Sister     Hypertension Brother     Diabetes Brother     Heart Disease Brother     No Known Problems Child      Social History     Tobacco Use    Smoking status: Former Smoker     Years: 1.00     Quit date: 1994     Years since quittin.8    Smokeless tobacco: Never Used    Tobacco comment: 1 pack yr   Substance Use Topics    Alcohol use: Not Currently      Lab Results   Component Value Date/Time    Cholesterol, total 228 (H) 2019 12:41 PM    HDL Cholesterol 88 2019 12:41 PM    LDL, calculated 116 (H) 2019 12:41 PM    Triglyceride 118 2019 12:41 PM     Lab Results   Component Value Date/Time    ALT (SGPT) 21 2019 12:41 PM    Alk. phosphatase 57 2019 12:41 PM    Bilirubin, total 0.3 2019 12:41 PM    Albumin 3.8 2019 12:41 PM    Protein, total 6.9 2019 12:41 PM    PLATELET 019 10/79/8773 12:41 PM        Review of Systems   Constitutional: Positive for malaise/fatigue. Negative for chills and fever. HENT: Negative for congestion and nosebleeds. Eyes: Negative for blurred vision and pain. Respiratory: Negative for cough, shortness of breath and wheezing. Cardiovascular: Negative for chest pain and leg swelling. Gastrointestinal: Negative for constipation, diarrhea, nausea and vomiting. Genitourinary: Negative for dysuria and frequency. Musculoskeletal: Negative for joint pain and myalgias. Skin: Negative for itching and rash. Neurological: Negative for dizziness, loss of consciousness and headaches. Psychiatric/Behavioral: Negative for depression. The patient is not nervous/anxious and does not have insomnia. Physical Exam  Vitals and nursing note reviewed. Constitutional:       Appearance: She is well-developed. HENT:      Head: Normocephalic and atraumatic. Eyes:      Conjunctiva/sclera: Conjunctivae normal.      Pupils: Pupils are equal, round, and reactive to light. Neck:      Thyroid: No thyromegaly. Vascular: No JVD. Cardiovascular:      Rate and Rhythm: Normal rate and regular rhythm. Heart sounds: Normal heart sounds. No murmur heard. No friction rub. No gallop. Pulmonary:      Effort: Pulmonary effort is normal. No respiratory distress. Breath sounds: Normal breath sounds. No stridor. No wheezing or rales. Abdominal:      General: Bowel sounds are normal. There is no distension. Palpations: Abdomen is soft. There is no mass. Tenderness: There is no abdominal tenderness. Musculoskeletal:         General: No tenderness. Normal range of motion. Lymphadenopathy:      Cervical: No cervical adenopathy. Skin:     Findings: No erythema or rash. Neurological:      Mental Status: She is alert and oriented to person, place, and time. Cranial Nerves: No cranial nerve deficit. Deep Tendon Reflexes: Reflexes are normal and symmetric. Psychiatric:         Behavior: Behavior normal.         ASSESSMENT and PLAN  Diagnoses and all orders for this visit:    1. Fatigue, unspecified type  -     SLEEP MEDICINE REFERRAL    2. Gastroesophageal reflux disease without esophagitis  -     pantoprazole (PROTONIX) 40 mg tablet; Take 1 Tablet by mouth daily. 3. Tinea pedis of right foot  -     ciclopirox (LOPROX) 0.77 % topical cream; Apply  to affected area two (2) times a day. 4. Advice given about COVID-19 virus infection    Other orders  -     sertraline (ZOLOFT) 25 mg tablet; Take 1 Tablet by mouth daily. Anti-reflux measures such as raising the head of the bed, avoiding tight clothing or belts, avoiding eating late at night and not lying down shortly after mealtime, overeating and achieving weight loss,   are discussed.  Also advised on avoiding ASA, NSAID's, caffeine,alcohol and tobacco.     Concern abdout COVID-19 addressed and detailed, pt was told that the best way to prevent illness is by protection with vaccination, and to Wear a facemask , having social distance, and to get tested if possible,   Pt was also told if develop dyspnea needs to call 911 or go to er, call for mari advise, pt agreed with todays recommendations,

## 2021-11-12 ENCOUNTER — OFFICE VISIT (OUTPATIENT)
Dept: FAMILY MEDICINE CLINIC | Age: 51
End: 2021-11-12
Payer: MEDICAID

## 2021-11-12 VITALS
OXYGEN SATURATION: 98 % | SYSTOLIC BLOOD PRESSURE: 163 MMHG | RESPIRATION RATE: 16 BRPM | WEIGHT: 156.9 LBS | HEIGHT: 62 IN | HEART RATE: 79 BPM | DIASTOLIC BLOOD PRESSURE: 90 MMHG | BODY MASS INDEX: 28.87 KG/M2

## 2021-11-12 DIAGNOSIS — Z12.31 ENCOUNTER FOR SCREENING MAMMOGRAM FOR MALIGNANT NEOPLASM OF BREAST: ICD-10-CM

## 2021-11-12 DIAGNOSIS — Z12.11 SCREEN FOR COLON CANCER: ICD-10-CM

## 2021-11-12 DIAGNOSIS — R30.0 BURNING WITH URINATION: Primary | ICD-10-CM

## 2021-11-12 DIAGNOSIS — N89.8 VAGINAL ITCHING: ICD-10-CM

## 2021-11-12 LAB
BILIRUB UR QL STRIP: NEGATIVE
GLUCOSE UR-MCNC: NEGATIVE MG/DL
KETONES P FAST UR STRIP-MCNC: NEGATIVE MG/DL
PH UR STRIP: 5.5 [PH] (ref 4.6–8)
PROT UR QL STRIP: NEGATIVE
SP GR UR STRIP: 1.03 (ref 1–1.03)
UA UROBILINOGEN AMB POC: NORMAL (ref 0.2–1)
URINALYSIS CLARITY POC: CLEAR
URINALYSIS COLOR POC: YELLOW
URINE BLOOD POC: NEGATIVE
URINE LEUKOCYTES POC: NEGATIVE
URINE NITRITES POC: NEGATIVE

## 2021-11-12 PROCEDURE — 81003 URINALYSIS AUTO W/O SCOPE: CPT | Performed by: FAMILY MEDICINE

## 2021-11-12 PROCEDURE — 99213 OFFICE O/P EST LOW 20 MIN: CPT | Performed by: FAMILY MEDICINE

## 2021-11-12 RX ORDER — NYSTATIN 100000 U/G
CREAM TOPICAL 2 TIMES DAILY
Qty: 30 G | Refills: 3 | Status: SHIPPED | OUTPATIENT
Start: 2021-11-12

## 2021-11-12 NOTE — PROGRESS NOTES
HISTORY OF PRESENT ILLNESS  Alexy Regalado is a 53 y.o. female. Today's Covid vaccinated Pt main concerns were provided in the clinic,    pt is w/ comorbid history and   Pt has been trying to wear mask most of the times,  pt has no fever no cough no dyspnea, no ha, not dizzy, nl smell nl taste, no N/V/D, has no orbital pain,  no body ache. Depression with the anxiety and panic states of mind    nicley controlled with the current meds, not able to tolerate any SSRI or other recommended antidepressive or antianxiety meds except for the current JAH enhancers,   Patient state that it is getting better: pt states and reports of feeling less anxius, less guilty feeling,  less Hoplessness,ns/nh,ni,nh, less trouble with weight gain or loss, less tendency of etoh or illicit drug use, more ability of sleep, more ablitiy  to concentrate at work and at home with the current medications,and all together a safe feeling at home and at work        HTN  Today pt present for Bp check and the patient stating that so far there has been a Compliancy w/ the bp meds, having had the low salt diet and try to the best of pt's knowledge to avoid smoked meats, the patient has been active life style and does do the daily walking, itoday the pt denies Chest Pain, has no legs swelling no lightheadedness,the pat has not been feeling anxious, and  Has not been feeling stressed out, otherwise feeling better since the last visit    Current Outpatient Medications   Medication Sig Dispense Refill    pantoprazole (PROTONIX) 40 mg tablet Take 1 Tablet by mouth daily. 30 Tablet 3    sertraline (ZOLOFT) 25 mg tablet Take 1 Tablet by mouth daily. 30 Tablet 1    ergocalciferol (ERGOCALCIFEROL) 1,250 mcg (50,000 unit) capsule TAKE 1 CAPSULE BY MOUTH ONE TIME PER WEEK 4 Capsule 4    meclizine (ANTIVERT) 25 mg tablet Take 1 Tab by mouth three (3) times daily as needed for Dizziness.  30 Tab 0    medroxyPROGESTERone (PROVERA) 10 mg tablet TAKE 1 TAB BY MOUTH DAILY ON DAYS 5 THROUGH 15 OF MONTH TO REGULATE MENSES      ferrous sulfate 325 mg (65 mg iron) tablet Take 1 Tab by mouth two (2) times a day. 60 Tab 6    fluocinoNIDE (LIDEX) 0.05 % topical cream Apply  to affected area two (2) times a day. 60 g 5    acetaminophen (TYLENOL) 500 mg tablet Take  by mouth every six (6) hours as needed for Pain.  ciclopirox (LOPROX) 0.77 % topical cream Apply  to affected area two (2) times a day. (Patient not taking: Reported on 2021) 30 g 3    calcipotriene (DOVONEX) 0.005 % topical cream Apply  to affected area two (2) times a day. (Patient not taking: Reported on 2021) 60 g 1    clobetasoL (TEMOVATE) 0.05 % topical cream Apply  to affected area two (2) times a day. (Patient not taking: Reported on 2021) 45 g 1    ammonium lactate (AMLACTIN) 12 % topical cream Apply  to affected area two (2) times a day. rub in to affected area well (Patient not taking: Reported on 2021) 140 g 1    coal tar (OXIPOR VHC) 5 % lotion Apply  to affected area two (2) times a day. (Patient not taking: Reported on 2021) 56 mL 0    scopolamine (TRANSDERM-SCOP) 1 mg over 3 days pt3d 1 Patch by TransDERmal route every seventy-two (72) hours.  (Patient not taking: Reported on 2021) 4 Patch 0     Allergies   Allergen Reactions    Percocet [Oxycodone-Acetaminophen] Itching     Past Medical History:   Diagnosis Date    Contact dermatitis and other eczema, due to unspecified cause     Headache(784.0)     [facility]     Past Surgical History:   Procedure Laterality Date    HX GYN      D&C in     HX GYN       X1    HX TUBAL LIGATION       Family History   Problem Relation Age of Onset    Diabetes Mother     Hypertension Mother     Heart Disease Mother     Kidney Disease Mother         esrd on [de-identified]     Stroke Mother     Heart Disease Father     Diabetes Sister     Hypertension Sister  Stroke Sister     Heart Disease Sister     Hypertension Brother     Diabetes Brother     Heart Disease Brother     No Known Problems Child      Social History     Tobacco Use    Smoking status: Former Smoker     Years: 1.00     Quit date: 1994     Years since quittin.8    Smokeless tobacco: Never Used    Tobacco comment: 1 pack yr   Substance Use Topics    Alcohol use: Not Currently      Lab Results   Component Value Date/Time    WBC 5.1 2019 12:41 PM    HGB 11.7 2019 12:41 PM    HCT 35.3 2019 12:41 PM    PLATELET 574  12:41 PM    MCV 86 2019 12:41 PM     Lab Results   Component Value Date/Time    Hemoglobin A1c 5.4 2019 12:41 PM    Hemoglobin A1c 5.6 2013 10:18 AM    Hemoglobin A1c 5.8 (H) 2012 09:43 AM    Glucose 80 2019 12:41 PM    LDL, calculated 116 (H) 2019 12:41 PM    Creatinine 0.82 2019 12:41 PM         Review of Systems   Constitutional: Negative for chills and fever. HENT: Negative for congestion and nosebleeds. Eyes: Negative for blurred vision and pain. Respiratory: Negative for cough, shortness of breath and wheezing. Cardiovascular: Negative for chest pain and leg swelling. Gastrointestinal: Negative for constipation, diarrhea, nausea and vomiting. Genitourinary: Negative for dysuria and frequency. Musculoskeletal: Negative for joint pain and myalgias. Skin: Negative for itching and rash. Neurological: Negative for dizziness, loss of consciousness and headaches. Psychiatric/Behavioral: Negative for depression. The patient is not nervous/anxious and does not have insomnia. Physical Exam  Vitals and nursing note reviewed. Constitutional:       Appearance: She is well-developed. HENT:      Head: Normocephalic and atraumatic. Eyes:      Conjunctiva/sclera: Conjunctivae normal.      Pupils: Pupils are equal, round, and reactive to light. Neck:      Thyroid: No thyromegaly. Vascular: No JVD. Cardiovascular:      Rate and Rhythm: Normal rate and regular rhythm. Heart sounds: Normal heart sounds. No murmur heard. No friction rub. No gallop. Pulmonary:      Effort: Pulmonary effort is normal. No respiratory distress. Breath sounds: Normal breath sounds. No stridor. No wheezing or rales. Abdominal:      General: Bowel sounds are normal. There is no distension. Palpations: Abdomen is soft. There is no mass. Tenderness: There is no abdominal tenderness. Musculoskeletal:         General: No tenderness. Normal range of motion. Lymphadenopathy:      Cervical: No cervical adenopathy. Skin:     Findings: No erythema or rash. Neurological:      Mental Status: She is alert and oriented to person, place, and time. Cranial Nerves: No cranial nerve deficit. Deep Tendon Reflexes: Reflexes are normal and symmetric. Psychiatric:         Behavior: Behavior normal.         ASSESSMENT and PLAN  Diagnoses and all orders for this visit:    1. Burning with urination  -     AMB POC URINALYSIS DIP STICK AUTO W/O MICRO  -     NUSWAB VAGINITIS PLUS; Future    2. Vaginal itching  -     NUSWAB VAGINITIS PLUS; Future    3. Screen for colon cancer  -     REFERRAL TO GASTROENTEROLOGY    4. Encounter for screening mammogram for malignant neoplasm of breast  -     [facility] MAMMO BI SCREENING INCL CAD; Future    Other orders  -     nystatin (MYCOSTATIN) topical cream; Apply  to affected area two (2) times a day. Follow-up and Dispositions    · Return if symptoms worsen or fail to improve.

## 2021-11-12 NOTE — PROGRESS NOTES
Chief Complaint   Patient presents with    Stress     1. Have you been to the ER, urgent care clinic since your last visit? Hospitalized since your last visit? No    2. Have you seen or consulted any other health care providers outside of the [address] [place] [address] since your last visit? Include any pap smears or colon screening. No    Verified patient with two type of identifiers.    Still feeling stress out , meds not helping,  Also requesting std check due to increase in temp of urine,  Stated \"my urine is really hot and itches after urination\"

## 2021-11-12 NOTE — PATIENT INSTRUCTIONS
Lifestyle Changes for Chronic Health Conditions: Care Instructions  Your Care Instructions     If you have diabetes, heart disease, or blood pressure or cholesterol problems, making healthy lifestyle changes can help. Changing your diet, getting more exercise, and getting rid of harmful habits can reduce your risk of heart attack, stroke, and other serious health problems. Even small changes can help. Start with steps that you can take right away. Think about things such as time limits, stress, and temptations that might get in the way, and figure out how you can avoid or overcome them. Work with your doctor to plan lifestyle changes to deal with your health problem. Follow-up care is a key part of your treatment and safety. Be sure to make and go to all appointments, and call your doctor if you are having problems. It's also a good idea to know your test results and keep a list of the medicines you take. How can you care for yourself at home? · If your doctor recommends it, get more exercise. Walking is a good choice. Bit by bit, increase the amount you walk every day. Try for at least 30 minutes on most days of the week. You also may want to swim, bike, or do other activities. · Eat a healthy diet. ? Choose fruits, vegetables, whole grains, protein, and low-fat dairy foods. ? Limit saturated fat and avoid trans fat. ? Try to limit how much sodium you eat to less than 2,300 milligrams (mg) a day. · Lose weight if you are overweight. A loss of just 10 pounds can help. The best way to lose weight and keep it off is to exercise on most days, choose healthy foods, and keep portion sizes under control. Aim to lose no more than 1 pound a week. · Do not smoke. Smoking can make most chronic health problems worse. If you need help quitting, talk to your doctor about stop-smoking programs and medicines. These can increase your chances of quitting for good.   · Limit alcohol to 2 drinks a day for men and 1 drink a day for women. Too much alcohol can cause health problems. · Take your medicines on time and in the right amounts. Use a pillbox to organize them, and use schedules, alarms, or other tools to help you stay on track. For medicines to work properly, you must take them as directed. Call your doctor if you think you are having a problem with your medicine. · Get your blood pressure checked often. Get a cholesterol test when your doctor tells you to. And keep track of your blood sugar if you have diabetes. Where can you learn more? Go to http://www.gray.com/  Enter K745 in the search box to learn more about \"Lifestyle Changes for Chronic Health Conditions: Care Instructions. \"  Current as of: June 16, 2021               Content Version: 13.0  © 2006-2021 [employer]. Care instructions adapted under license by [employer] (which disclaims liability or warranty for this information). If you have questions about a medical condition or this instruction, always ask your healthcare professional. [address] any warranty or liability for your use of this information.

## 2021-11-16 LAB
A VAGINAE DNA VAG QL NAA+PROBE: NORMAL SCORE
BVAB2 DNA VAG QL NAA+PROBE: NORMAL SCORE
C ALBICANS DNA VAG QL NAA+PROBE: NEGATIVE
C GLABRATA DNA VAG QL NAA+PROBE: NEGATIVE
C TRACH RRNA SPEC QL NAA+PROBE: NEGATIVE
MEGA1 DNA VAG QL NAA+PROBE: NORMAL SCORE
N GONORRHOEA RRNA SPEC QL NAA+PROBE: NEGATIVE
SPECIMEN SOURCE: NORMAL
T VAGINALIS RRNA SPEC QL NAA+PROBE: NEGATIVE

## 2022-02-16 ENCOUNTER — OFFICE VISIT (OUTPATIENT)
Dept: FAMILY MEDICINE CLINIC | Age: 52
End: 2022-02-16
Payer: MEDICAID

## 2022-02-16 VITALS
BODY MASS INDEX: 28.69 KG/M2 | TEMPERATURE: 98.5 F | SYSTOLIC BLOOD PRESSURE: 150 MMHG | RESPIRATION RATE: 16 BRPM | WEIGHT: 155.9 LBS | HEIGHT: 62 IN | OXYGEN SATURATION: 97 % | HEART RATE: 82 BPM | DIASTOLIC BLOOD PRESSURE: 90 MMHG

## 2022-02-16 DIAGNOSIS — L03.039 CELLULITIS OF TOE, UNSPECIFIED LATERALITY: ICD-10-CM

## 2022-02-16 DIAGNOSIS — M21.611 BUNION OF GREAT TOE OF RIGHT FOOT: Primary | ICD-10-CM

## 2022-02-16 DIAGNOSIS — H81.10 VERTIGO, BENIGN PAROXYSMAL, UNSPECIFIED LATERALITY: ICD-10-CM

## 2022-02-16 DIAGNOSIS — Z71.89 ADVICE GIVEN ABOUT COVID-19 VIRUS INFECTION: ICD-10-CM

## 2022-02-16 DIAGNOSIS — L20.82 FLEXURAL ECZEMA: ICD-10-CM

## 2022-02-16 PROCEDURE — 99214 OFFICE O/P EST MOD 30 MIN: CPT | Performed by: FAMILY MEDICINE

## 2022-02-16 RX ORDER — MECLIZINE HYDROCHLORIDE 25 MG/1
25 TABLET ORAL
Qty: 30 TABLET | Refills: 0 | Status: SHIPPED | OUTPATIENT
Start: 2022-02-16

## 2022-02-16 RX ORDER — ETODOLAC 400 MG/1
400 TABLET, FILM COATED ORAL 2 TIMES DAILY WITH MEALS
Qty: 20 TABLET | Refills: 0 | Status: SHIPPED | OUTPATIENT
Start: 2022-02-16 | End: 2022-04-18

## 2022-02-16 RX ORDER — METHYLPREDNISOLONE 4 MG/1
TABLET ORAL
Qty: 1 DOSE PACK | Refills: 0 | Status: SHIPPED | OUTPATIENT
Start: 2022-02-16 | End: 2022-04-18

## 2022-02-16 RX ORDER — FLUOCINONIDE 0.5 MG/G
CREAM TOPICAL 2 TIMES DAILY
Qty: 60 G | Refills: 5 | Status: SHIPPED | OUTPATIENT
Start: 2022-02-16 | End: 2022-04-18

## 2022-02-16 RX ORDER — CEPHALEXIN 500 MG/1
500 CAPSULE ORAL 3 TIMES DAILY
Qty: 21 CAPSULE | Refills: 0 | Status: SHIPPED | OUTPATIENT
Start: 2022-02-16 | End: 2022-02-23

## 2022-02-16 NOTE — PROGRESS NOTES
Room: 2    Identified pt with two pt identifiers(name and ). Reviewed record in preparation for visit and have obtained necessary documentation. All patient medications has been reviewed. Chief Complaint   Patient presents with    Follow-up     3 month    Hypertension    Anxiety     some improvement; still not sleeping well    Foot Pain     cream worked well on left foot; right foot continues pain       Health Maintenance Due   Topic    Hepatitis C Screening     Cervical cancer screen     Colorectal Cancer Screening Combo     Shingrix Vaccine Age 50> (1 of 2)    COVID-19 Vaccine (3 - Booster for Moderna series)       Vitals:    22 0846   BP: (!) 150/90   Pulse: 82   Resp: 16   Temp: 98.5 °F (36.9 °C)   TempSrc: Oral   SpO2: 97%   Weight: 155 lb 14.4 oz (70.7 kg)   Height: 5' 1.5\" (1.562 m)   PainSc:   5   PainLoc: Foot       4. Have you been to the ER, urgent care clinic since your last visit? Hospitalized since your last visit? No    5. Have you seen or consulted any other health care providers outside of the [address] [place] [address] since your last visit? Include any pap smears or colon screening. No    Patient is accompanied by self I have received verbal consent from Handy Sahu to discuss any/all medical information while they are present in the room.

## 2022-02-16 NOTE — PATIENT INSTRUCTIONS
Bunion Removal: Before Your Surgery  What is bunion surgery? Bunion surgery (bunionectomy) removes a lump of bone from your foot. This lump is called a bunion. It forms on the joint where your big toe joins your foot. The surgery will also straighten your big toe. Your doctor will make one or more small cuts near your toe joint. These cuts are called incisions. The doctor will remove small pieces of bone and may straighten your toe. This is done by cutting the bone and setting it in a new position. Your toe may be held in place with pins, screws, wires, or staples. These may stay in your toe. Or they may be removed after a few weeks. The surgery will leave scars that fade with time. The surgery may make walking easier. It may reduce stiffness, pain, or swelling in your toe joint. It may also improve the way your toe looks. Your doctor will give you medicine to help you relax and to numb your foot. Or you may get medicine to put you to sleep. You will probably go home on the day of your surgery. If your surgery is more complex, you may need to spend the night in the hospital.  How soon you can put weight on your toe depends on how complex your surgery is. It may take 6 weeks or longer before swelling goes down and you have healed enough to return to your normal routine. You may have some swelling and pain for as long as 6 months to a year. How do you prepare for surgery? Surgery can be stressful. This information will help you understand what you can expect. And it will help you safely prepare for surgery. Preparing for surgery    · Be sure you have someone to take you home. Anesthesia and pain medicine will make it unsafe for you to drive or get home on your own.     · Understand exactly what surgery is planned, along with the risks, benefits, and other options.     · If you take aspirin or some other blood thinner, ask your doctor if you should stop taking it before your surgery.  Make sure that you understand exactly what your doctor wants you to do. These medicines increase the risk of bleeding.     · Tell your doctor ALL the medicines, vitamins, supplements, and herbal remedies you take. Some may increase the risk of problems during your surgery. Your doctor will tell you if you should stop taking any of them before the surgery and how soon to do it.     · Make sure your doctor and the hospital have a copy of your advance directive. If you don't have one, you may want to prepare one. It lets others know your health care wishes. It's a good thing to have before any type of surgery or procedure. What happens on the day of surgery? · Follow the instructions exactly about when to stop eating and drinking. If you don't, your surgery may be canceled. If your doctor told you to take your medicines on the day of surgery, take them with only a sip of water.     · Take a bath or shower before you come in for your surgery. Do not apply lotions, perfumes, deodorants, or nail polish.     · Do not shave the surgical site yourself.     · Wear clothing that is easy to put on and take off. You may have a large bandage on your foot.     · Take off all jewelry and piercings. And take out contact lenses, if you wear them. At the hospital or surgery center   · Bring a picture ID.     · The area for surgery is often marked to make sure there are no errors.     · You will be kept comfortable and safe by your anesthesia provider. The anesthesia may make you sleep. Or it may just numb the area being worked on.     · The surgery will usually take about 1 to 1½ hours. If you are having both feet done at the same time, it may take 2 to 3 hours. When should you call your doctor? · You have questions or concerns.     · You don't understand how to prepare for your surgery.     · You become ill before the surgery (such as fever, flu, or a cold).     · You need to reschedule or have changed your mind about having the surgery. Where can you learn more? Go to http://www.gray.com/  Enter A466 in the search box to learn more about \"Bunion Removal: Before Your Surgery. \"  Current as of: July 1, 2021               Content Version: 13.0  © 1067-6880 Healthwise, Inc[employer]. Care instructions adapted under license by [employer] (which disclaims liability or warranty for this information). If you have questions about a medical condition or this instruction, always ask your healthcare professional. [address] any warranty or liability for your use of this information.

## 2022-02-16 NOTE — PROGRESS NOTES
HISTORY OF PRESENT ILLNESS  Segun Jimenez is a 49 y.o. female. Today's Covid vaccinated Pt main concerns were provided in the clinic,    pt is w/ comorbid history and   Pt has been trying to wear mask most of the times,   Did not like the zoloft,   Awaiting for sleep study    rt foot pain  Has been bothering the patient for few months, Has no history of being flat-footed patient does have history of long periods of standing and walking on hard concrete floor, currently has no calluses or plantar warts, unfortunately patient has abnormal BMI the pain is worsened with walking more than 1-2 blocks, and going up and down the steps and worsening since onset. Patient states that it is a dull nonradiating no numbness ++++ tingling sensation disabling, morning stiffness which gets better with activity and with the severity of 8/10. So for patient denies any history of extremity trauma, and has no leg swelling no skin lesion noted. Current Outpatient Medications   Medication Sig Dispense Refill    fluocinoNIDE (LIDEX) 0.05 % topical cream Apply  to affected area two (2) times a day. 60 g 5    meclizine (ANTIVERT) 25 mg tablet Take 1 Tablet by mouth three (3) times daily as needed for Dizziness. 30 Tablet 0    cephALEXin (KEFLEX) 500 mg capsule Take 1 Capsule by mouth three (3) times daily for 7 days. 21 Capsule 0    methylPREDNISolone (MEDROL DOSEPACK) 4 mg tablet As directed for 6 days one package 1 Dose Pack 0    etodolac (LODINE) 400 mg tablet Take 1 Tablet by mouth two (2) times daily (with meals). 20 Tablet 0    nystatin (MYCOSTATIN) topical cream Apply  to affected area two (2) times a day.  30 g 3    ergocalciferol (ERGOCALCIFEROL) 1,250 mcg (50,000 unit) capsule TAKE 1 CAPSULE BY MOUTH ONE TIME PER WEEK 4 Capsule 4    medroxyPROGESTERone (PROVERA) 10 mg tablet TAKE 1 TAB BY MOUTH DAILY ON DAYS 5 THROUGH 15 OF MONTH TO REGULATE MENSES      ferrous sulfate 325 mg (65 mg iron) tablet Take 1 Tab by mouth two (2) times a day. 60 Tab 6    ammonium lactate (AMLACTIN) 12 % topical cream Apply  to affected area two (2) times a day. rub in to affected area well 140 g 1    acetaminophen (TYLENOL) 500 mg tablet Take  by mouth every six (6) hours as needed for Pain.  pantoprazole (PROTONIX) 40 mg tablet Take 1 Tablet by mouth daily. (Patient not taking: Reported on 2022) 30 Tablet 3    calcipotriene (DOVONEX) 0.005 % topical cream Apply  to affected area two (2) times a day. (Patient not taking: Reported on 2021) 60 g 1    clobetasoL (TEMOVATE) 0.05 % topical cream Apply  to affected area two (2) times a day. (Patient not taking: Reported on 2021) 45 g 1    coal tar (OXIPOR VHC) 5 % lotion Apply  to affected area two (2) times a day. (Patient not taking: Reported on 2021) 56 mL 0    scopolamine (TRANSDERM-SCOP) 1 mg over 3 days pt3d 1 Patch by TransDERmal route every seventy-two (72) hours.  (Patient not taking: Reported on 2021) 4 Patch 0     Allergies   Allergen Reactions    Percocet [Oxycodone-Acetaminophen] Itching     Past Medical History:   Diagnosis Date    Contact dermatitis and other eczema, due to unspecified cause     Headache(784.0)     Other screening mammogram 13    [facility]-Normal-repeat one yr     Past Surgical History:   Procedure Laterality Date    HX GYN      D&C in     HX GYN       X1    HX TUBAL LIGATION       Family History   Problem Relation Age of Onset    Diabetes Mother     Hypertension Mother     Heart Disease Mother     Kidney Disease Mother         esrd on [de-identified]     Stroke Mother     Heart Disease Father     Diabetes Sister     Hypertension Sister     Stroke Sister     Heart Disease Sister     Hypertension Brother     Diabetes Brother     Heart Disease Brother     No Known Problems Child      Social History     Tobacco Use    Smoking status: Former Smoker     Years: 1.00     Quit date: 1994     Years since quittin.1  Smokeless tobacco: Never Used    Tobacco comment: 1 pack yr   Substance Use Topics    Alcohol use: Not Currently      Lab Results   Component Value Date/Time    WBC 5.1 07/19/2019 12:41 PM    HGB 11.7 07/19/2019 12:41 PM    HCT 35.3 07/19/2019 12:41 PM    PLATELET 032 53/72/9985 12:41 PM    MCV 86 07/19/2019 12:41 PM     Lab Results   Component Value Date/Time    Hemoglobin A1c 5.4 07/19/2019 12:41 PM    Hemoglobin A1c 5.6 01/29/2013 10:18 AM    Hemoglobin A1c 5.8 (H) 07/20/2012 09:43 AM    Glucose 80 07/19/2019 12:41 PM    LDL, calculated 116 (H) 07/19/2019 12:41 PM    Creatinine 0.82 07/19/2019 12:41 PM         Review of Systems   Constitutional: Negative for chills and fever. HENT: Negative for congestion and nosebleeds. Eyes: Negative for blurred vision and pain. Respiratory: Negative for cough, shortness of breath and wheezing. Cardiovascular: Negative for chest pain and leg swelling. Gastrointestinal: Negative for constipation, diarrhea, nausea and vomiting. Genitourinary: Negative for dysuria and frequency. Musculoskeletal: Negative for joint pain and myalgias. Skin: Negative for itching and rash. Neurological: Negative for dizziness, loss of consciousness and headaches. Psychiatric/Behavioral: Negative for depression. The patient is not nervous/anxious and does not have insomnia. Physical Exam  Vitals and nursing note reviewed. Constitutional:       Appearance: She is well-developed. HENT:      Head: Normocephalic and atraumatic. Eyes:      Conjunctiva/sclera: Conjunctivae normal.      Pupils: Pupils are equal, round, and reactive to light. Neck:      Thyroid: No thyromegaly. Vascular: No JVD. Cardiovascular:      Rate and Rhythm: Normal rate and regular rhythm. Heart sounds: Normal heart sounds. No murmur heard. No friction rub. No gallop. Pulmonary:      Effort: Pulmonary effort is normal. No respiratory distress.       Breath sounds: Normal breath sounds. No stridor. No wheezing or rales. Abdominal:      General: Bowel sounds are normal. There is no distension. Palpations: Abdomen is soft. There is no mass. Tenderness: There is no abdominal tenderness. Musculoskeletal:         General: No tenderness. Normal range of motion. Lymphadenopathy:      Cervical: No cervical adenopathy. Skin:     Findings: No erythema or rash. Neurological:      Mental Status: She is alert and oriented to person, place, and time. Cranial Nerves: No cranial nerve deficit. Deep Tendon Reflexes: Reflexes are normal and symmetric. Psychiatric:         Mood and Affect: Mood normal.         Behavior: Behavior normal.         Thought Content: Thought content normal.         Judgment: Judgment normal.         ASSESSMENT and PLAN  Diagnoses and all orders for this visit:    1. Bunion of great toe of right foot  -     REFERRAL TO PODIATRY  -     methylPREDNISolone (MEDROL DOSEPACK) 4 mg tablet; As directed for 6 days one package  -     etodolac (LODINE) 400 mg tablet; Take 1 Tablet by mouth two (2) times daily (with meals). -     XR GREAT TOE RT MIN 2 V; Future    2. Advice given about COVID-19 virus infection    3. Vertigo, benign paroxysmal, unspecified laterality  -     meclizine (ANTIVERT) 25 mg tablet; Take 1 Tablet by mouth three (3) times daily as needed for Dizziness. 4. Flexural eczema  -     fluocinoNIDE (LIDEX) 0.05 % topical cream; Apply  to affected area two (2) times a day. 5. Cellulitis of toe, unspecified laterality  -     cephALEXin (KEFLEX) 500 mg capsule; Take 1 Capsule by mouth three (3) times daily for 7 days. -     methylPREDNISolone (MEDROL DOSEPACK) 4 mg tablet; As directed for 6 days one package  -     etodolac (LODINE) 400 mg tablet; Take 1 Tablet by mouth two (2) times daily (with meals).   -     XR GREAT TOE RT MIN 2 V; Future      Follow-up and Dispositions    · Return in about 3 months (around 5/16/2022), or if symptoms worsen or fail to improve.   Routing History

## 2022-03-18 PROBLEM — G56.03 BILATERAL CARPAL TUNNEL SYNDROME: Status: ACTIVE | Noted: 2020-01-16

## 2022-03-18 PROBLEM — G44.209 TENSION VASCULAR HEADACHE: Status: ACTIVE | Noted: 2020-01-16

## 2022-03-19 PROBLEM — M47.22 CERVICAL RADICULOPATHY DUE TO DEGENERATIVE JOINT DISEASE OF SPINE: Status: ACTIVE | Noted: 2020-01-16

## 2022-03-19 PROBLEM — I65.23 BILATERAL CAROTID ARTERY STENOSIS: Status: ACTIVE | Noted: 2020-01-16

## 2022-03-20 PROBLEM — R42 DIZZINESS AND GIDDINESS: Status: ACTIVE | Noted: 2020-01-16

## 2022-03-20 PROBLEM — D50.9 IRON DEFICIENCY ANEMIA: Status: ACTIVE | Noted: 2017-03-21

## 2022-03-20 PROBLEM — H81.10 VERTIGO, BENIGN PAROXYSMAL, UNSPECIFIED LATERALITY: Status: ACTIVE | Noted: 2020-01-16

## 2022-04-13 DIAGNOSIS — E55.9 VITAMIN D DEFICIENCY: ICD-10-CM

## 2022-04-15 RX ORDER — ERGOCALCIFEROL 1.25 MG/1
CAPSULE ORAL
Qty: 4 CAPSULE | Refills: 4 | Status: SHIPPED | OUTPATIENT
Start: 2022-04-15 | End: 2022-04-18

## 2022-04-18 ENCOUNTER — ANESTHESIA EVENT (OUTPATIENT)
Dept: ENDOSCOPY | Age: 52
End: 2022-04-18
Payer: MEDICAID

## 2022-04-18 NOTE — ANESTHESIA PREPROCEDURE EVALUATION
Relevant Problems   NEUROLOGY   (+) Tension vascular headache      HEMATOLOGY   (+) Iron deficiency anemia       Anesthetic History               Review of Systems / Medical History  Patient summary reviewed, nursing notes reviewed and pertinent labs reviewed    Pulmonary                   Neuro/Psych         Headaches    Comments: Dizziness and giddiness    Cervical radiculopathy due to degenerative joint disease of spine Cardiovascular                    Comments: Bilateral carotid artery stenosis    ECG (2/19/18):   Normal sinus rhythm   Normal ECG   No previous ECGs available   GI/Hepatic/Renal     GERD          Comments: Screening colonoscopy (4/19/22) Endo/Other        Obesity and arthritis     Other Findings              Physical Exam    Airway  Mallampati: II  TM Distance: > 6 cm  Neck ROM: normal range of motion   Mouth opening: Normal     Cardiovascular  Regular rate and rhythm,  S1 and S2 normal,  no murmur, click, rub, or gallop  Rhythm: regular  Rate: normal         Dental  No notable dental hx       Pulmonary  Breath sounds clear to auscultation               Abdominal  GI exam deferred       Other Findings            Anesthetic Plan    ASA: 2  Anesthesia type: MAC          Induction: Intravenous  Anesthetic plan and risks discussed with: Patient

## 2022-04-19 ENCOUNTER — HOSPITAL ENCOUNTER (OUTPATIENT)
Age: 52
Setting detail: OUTPATIENT SURGERY
Discharge: HOME | End: 2022-04-19
Attending: INTERNAL MEDICINE | Admitting: INTERNAL MEDICINE
Payer: MEDICAID

## 2022-04-19 ENCOUNTER — ANESTHESIA (OUTPATIENT)
Dept: ENDOSCOPY | Age: 52
End: 2022-04-19
Payer: MEDICAID

## 2022-04-19 VITALS
RESPIRATION RATE: 16 BRPM | WEIGHT: 152 LBS | TEMPERATURE: 97.3 F | HEART RATE: 54 BPM | HEIGHT: 61 IN | BODY MASS INDEX: 28.7 KG/M2 | DIASTOLIC BLOOD PRESSURE: 92 MMHG | SYSTOLIC BLOOD PRESSURE: 184 MMHG | OXYGEN SATURATION: 100 %

## 2022-04-19 LAB — HCG UR QL: NEGATIVE

## 2022-04-19 PROCEDURE — 74011250636 HC RX REV CODE- 250/636: Performed by: ANESTHESIOLOGY

## 2022-04-19 PROCEDURE — 81025 URINE PREGNANCY TEST: CPT

## 2022-04-19 PROCEDURE — 74011250636 HC RX REV CODE- 250/636: Performed by: INTERNAL MEDICINE

## 2022-04-19 PROCEDURE — 76040000019: Performed by: INTERNAL MEDICINE

## 2022-04-19 PROCEDURE — 2709999900 HC NON-CHARGEABLE SUPPLY: Performed by: INTERNAL MEDICINE

## 2022-04-19 PROCEDURE — 76060000031 HC ANESTHESIA FIRST 0.5 HR: Performed by: INTERNAL MEDICINE

## 2022-04-19 RX ORDER — SODIUM CHLORIDE 0.9 % (FLUSH) 0.9 %
5-40 SYRINGE (ML) INJECTION EVERY 8 HOURS
Status: DISCONTINUED | OUTPATIENT
Start: 2022-04-19 | End: 2022-04-19 | Stop reason: HOSPADM

## 2022-04-19 RX ORDER — SODIUM CHLORIDE 9 MG/ML
75 INJECTION, SOLUTION INTRAVENOUS CONTINUOUS
Status: DISCONTINUED | OUTPATIENT
Start: 2022-04-19 | End: 2022-04-19 | Stop reason: HOSPADM

## 2022-04-19 RX ORDER — ATROPINE SULFATE 0.1 MG/ML
0.5 INJECTION INTRAVENOUS
Status: DISCONTINUED | OUTPATIENT
Start: 2022-04-19 | End: 2022-04-19 | Stop reason: HOSPADM

## 2022-04-19 RX ORDER — DEXTROMETHORPHAN/PSEUDOEPHED 2.5-7.5/.8
1.2 DROPS ORAL
Status: DISCONTINUED | OUTPATIENT
Start: 2022-04-19 | End: 2022-04-19 | Stop reason: HOSPADM

## 2022-04-19 RX ORDER — FENTANYL CITRATE 50 UG/ML
25 INJECTION, SOLUTION INTRAMUSCULAR; INTRAVENOUS
Status: DISCONTINUED | OUTPATIENT
Start: 2022-04-19 | End: 2022-04-19 | Stop reason: HOSPADM

## 2022-04-19 RX ORDER — EPINEPHRINE 0.1 MG/ML
1 INJECTION INTRACARDIAC; INTRAVENOUS
Status: DISCONTINUED | OUTPATIENT
Start: 2022-04-19 | End: 2022-04-19 | Stop reason: HOSPADM

## 2022-04-19 RX ORDER — SODIUM CHLORIDE 9 MG/ML
INJECTION, SOLUTION INTRAVENOUS
Status: DISCONTINUED | OUTPATIENT
Start: 2022-04-19 | End: 2022-04-19 | Stop reason: HOSPADM

## 2022-04-19 RX ORDER — FLUMAZENIL 0.1 MG/ML
0.2 INJECTION INTRAVENOUS
Status: DISCONTINUED | OUTPATIENT
Start: 2022-04-19 | End: 2022-04-19 | Stop reason: HOSPADM

## 2022-04-19 RX ORDER — MIDAZOLAM HYDROCHLORIDE 1 MG/ML
.25-5 INJECTION, SOLUTION INTRAMUSCULAR; INTRAVENOUS
Status: DISCONTINUED | OUTPATIENT
Start: 2022-04-19 | End: 2022-04-19 | Stop reason: HOSPADM

## 2022-04-19 RX ORDER — SODIUM CHLORIDE 0.9 % (FLUSH) 0.9 %
5-40 SYRINGE (ML) INJECTION AS NEEDED
Status: DISCONTINUED | OUTPATIENT
Start: 2022-04-19 | End: 2022-04-19 | Stop reason: HOSPADM

## 2022-04-19 RX ORDER — NALOXONE HYDROCHLORIDE 0.4 MG/ML
0.4 INJECTION, SOLUTION INTRAMUSCULAR; INTRAVENOUS; SUBCUTANEOUS
Status: DISCONTINUED | OUTPATIENT
Start: 2022-04-19 | End: 2022-04-19 | Stop reason: HOSPADM

## 2022-04-19 RX ORDER — PROPOFOL 10 MG/ML
INJECTION, EMULSION INTRAVENOUS AS NEEDED
Status: DISCONTINUED | OUTPATIENT
Start: 2022-04-19 | End: 2022-04-19 | Stop reason: HOSPADM

## 2022-04-19 RX ADMIN — PROPOFOL 20 MG: 10 INJECTION, EMULSION INTRAVENOUS at 08:09

## 2022-04-19 RX ADMIN — SODIUM CHLORIDE 75 ML/HR: 9 INJECTION, SOLUTION INTRAVENOUS at 07:40

## 2022-04-19 RX ADMIN — PROPOFOL 80 MG: 10 INJECTION, EMULSION INTRAVENOUS at 08:05

## 2022-04-19 RX ADMIN — SODIUM CHLORIDE: 0.9 INJECTION, SOLUTION INTRAVENOUS at 08:01

## 2022-04-19 RX ADMIN — PROPOFOL 30 MG: 10 INJECTION, EMULSION INTRAVENOUS at 08:10

## 2022-04-19 NOTE — H&P
Gastroenterology Outpatient History and Physical    Patient: Lzizie Salomon    Physician: Rachel Molina MD    Chief Complaint: CRC screening  History of Present Illness: 53yo F with need for CRC screening. Last colonoscopy . No GI s/sx. No fam hx CRc or polyps    History:  Past Medical History:   Diagnosis Date    Contact dermatitis and other eczema, due to unspecified cause     GERD (gastroesophageal reflux disease)     Headache(784.0)     Other screening mammogram 13    [facility]-Normal-repeat one yr      Past Surgical History:   Procedure Laterality Date    HX GYN      D&C in     HX GYN       X1    HX TUBAL LIGATION        Social History     Socioeconomic History    Marital status: SINGLE    Number of children: 1   Occupational History     Comment: care giver    Tobacco Use    Smoking status: Former Smoker     Packs/day: 0.25     Years: 4.00     Pack years: 1.00     Quit date: 1994     Years since quittin.3    Smokeless tobacco: Never Used    Tobacco comment: 1 pack yr   Vaping Use    Vaping Use: Never used   Substance and Sexual Activity    Alcohol use:  Yes     Alcohol/week: 1.0 standard drink     Types: 1 Shots of liquor per week     Comment: socially    Drug use: Not Currently     Types: Marijuana    Sexual activity: Not Currently     Partners: Male     Birth control/protection: Condom, Surgical     Comment: single      Family History   Problem Relation Age of Onset    Diabetes Mother     Hypertension Mother     Heart Disease Mother     Kidney Disease Mother         esrd on [de-identified]     Stroke Mother     Heart Disease Father     Diabetes Sister     Hypertension Sister     Stroke Sister     Heart Disease Sister     Hypertension Brother     Diabetes Brother     Heart Disease Brother     No Known Problems Child       Patient Active Problem List   Diagnosis Code    Eczema L30.9    Vitamin D deficiency E55.9    History of bilateral tubal ligation Z98.51    Iron deficiency anemia D50.9    Vertigo, benign paroxysmal, unspecified laterality H81.10    Dizziness and giddiness R42    Tension vascular headache G44.209    Bilateral carotid artery stenosis I65.23    Bilateral carpal tunnel syndrome G56.03    Cervical radiculopathy due to degenerative joint disease of spine M47.22       Allergies: Allergies   Allergen Reactions    Percocet [Oxycodone-Acetaminophen] Itching     Medications:   Prior to Admission medications    Medication Sig Start Date End Date Taking? Authorizing Provider   meclizine (ANTIVERT) 25 mg tablet Take 1 Tablet by mouth three (3) times daily as needed for Dizziness. 2/16/22  Yes Chu Estrada MD   medroxyPROGESTERone (PROVERA) 10 mg tablet TAKE 1 TAB BY MOUTH DAILY ON DAYS 5 THROUGH 15 OF MONTH TO REGULATE MENSES 11/13/20  Yes Provider, Odette   ferrous sulfate 325 mg (65 mg iron) tablet Take 1 Tab by mouth two (2) times a day. 1/7/21  Yes Chu Estrada MD   nystatin (MYCOSTATIN) topical cream Apply  to affected area two (2) times a day. 11/12/21   Chu Estrada MD   clobetasoL (TEMOVATE) 0.05 % topical cream Apply  to affected area two (2) times a day. 1/7/21   Chu Estrada MD   acetaminophen (TYLENOL) 500 mg tablet Take  by mouth every six (6) hours as needed for Pain. Provider, Historical     Physical Exam:   Vital Signs: Blood pressure (!) 163/89, pulse 72, temperature 97.8 °F (36.6 °C), resp. rate 15, height 5' 1\" (1.549 m), weight 68.9 kg (152 lb), last menstrual period 04/01/2022, SpO2 100 %, not currently breastfeeding.   General: well developed, well nourished   HEENT: unremarkable   Heart: regular rhythm no mumur    Lungs: clear   Abdominal:  benign   Neurological: unremarkable   Extremities: no edema     Findings/Diagnosis: CRC screening  Plan of Care/Planned Procedure: colonoscopy with conscious/deep sedation    Signed:  Jarad Cronin MD 4/19/2022

## 2022-04-19 NOTE — ANESTHESIA POSTPROCEDURE EVALUATION
Procedure(s):  COLONOSCOPY. MAC    Anesthesia Post Evaluation        Patient location during evaluation: PACU  Note status: Adequate. Level of consciousness: responsive to verbal stimuli and sleepy but conscious  Pain management: satisfactory to patient  Airway patency: patent  Anesthetic complications: no  Cardiovascular status: acceptable  Respiratory status: acceptable  Hydration status: acceptable  Comments: +Post-Anesthesia Evaluation and Assessment    Patient: Steven Conklin MRN: [id number]  SSN: [id number]   YOB: 1970  Age: 49 y.o. Sex: female      Cardiovascular Function/Vital Signs    BP (!) 143/77   Pulse (!) 57   Temp 36.6 °C (97.8 °F)   Resp 22   Ht 5' 1\" (1.549 m)   Wt 68.9 kg (152 lb)   SpO2 100%   Breastfeeding No   BMI 28.72 kg/m²     Patient is status post Procedure(s):  COLONOSCOPY. Nausea/Vomiting: Controlled. Postoperative hydration reviewed and adequate. Pain:  Pain Scale 1: Numeric (0 - 10) (04/19/22 0733)  Pain Intensity 1: 0 (04/19/22 0733)   Managed. Neurological Status: At baseline. Mental Status and Level of Consciousness: Arousable. Pulmonary Status:   O2 Device: CO2 nasal cannula (04/19/22 0818)   Adequate oxygenation and airway patent. Complications related to anesthesia: None    Post-anesthesia assessment completed. No concerns.     Signed By: Jr iLmon MD    4/19/2022  Post anesthesia nausea and vomiting:  controlled      INITIAL Post-op Vital signs:   Vitals Value Taken Time   /77 04/19/22 0818   Temp     Pulse 57 04/19/22 0818   Resp 22 04/19/22 0818   SpO2 100 % 04/19/22 0818

## 2022-04-19 NOTE — PERIOP NOTES
See anesthesia note and MAR for medications given during procedure. Received report from anesthesia staff on vital signs and status of patient.     Endoscope was pre-cleaned at the bedside immediately following procedure by Timothy Jones ET.

## 2022-04-19 NOTE — DISCHARGE INSTRUCTIONS
Rose Youngblood  [id number]  1970    COLON DISCHARGE INSTRUCTIONS  Discomfort:  Redness at IV site- apply warm compress to area; if redness or soreness persist- contact your physician  There may be a slight amount of blood passed from the rectum  Gaseous discomfort- walking, belching will help relieve any discomfort  You may not operate a vehicle for 12 hours  You may not engage in an occupation involving machinery or appliances for rest of today  You may not drink alcoholic beverages for at least 12 hours  Avoid making any critical decisions for at least 24 hour  DIET:   High fiber diet. - however -  remember your colon is empty and a heavy meal will produce gas. Avoid these foods:  vegetables, fried / greasy foods, carbonated drinks for today  MEDICATION:         ACTIVITY:  You may not resume your normal daily activities until tomorrow AM; it is recommended that you spend the remainder of the day resting -  avoid any strenuous activity. CALL M.D.   ANY SIGN OF:   Increasing pain, nausea, vomiting  Abdominal distension (swelling)  New increased bleeding (oral or rectal)  Fever (chills)  Pain in chest area  Bloody discharge from nose or mouth  Shortness of breath    IMPRESSION:  -Normal colonic mucosa without masses, polyps, or inflammation.  -Small grade 1 internal hemorrhoids    Follow-up Instructions:   Call Dr. Gladis Alvarez if questions arise regarding your procedure  Telephone # [phone]  Repeat colonoscopy in 10 years    Shira Martin MD

## 2022-04-19 NOTE — ROUTINE PROCESS
[facility]  1970  [id number]    Situation:  Verbal report received from: PAGE Underwood RN  Procedure: Procedure(s):  COLONOSCOPY    Background:    Preoperative diagnosis: SCREENING  Postoperative diagnosis: Hemorrhoids     :  Dr. Jamin Jerez  Assistant(s): Endoscopy Technician-1: Roberto Agrawal  Endoscopy RN-1: Abdoulaye Packer    Specimens: * No specimens in log *  H. Pylori  no    Assessment:  Intra-procedure medications       Anesthesia gave intra-procedure sedation and medications, see anesthesia flow sheet yes    Intravenous fluids: NS@ KVO     Vital signs stable   yes    Abdominal assessment: round and soft   yes    Recommendation:  Discharge patient per MD order  yes.     Family or Mortimer Estelle, fiance  Permission to share finding with family or friend yes

## 2022-05-06 DIAGNOSIS — L20.82 FLEXURAL ECZEMA: ICD-10-CM

## 2022-05-06 RX ORDER — FLUOCINONIDE 0.5 MG/G
CREAM TOPICAL 2 TIMES DAILY
Qty: 60 G | Refills: 5 | Status: SHIPPED | OUTPATIENT
Start: 2022-05-06

## 2022-05-06 NOTE — TELEPHONE ENCOUNTER
Last visit:2/16/22  Next visit:5/17/22  Previous refill 2/16/22(60g+5R)      Requested Prescriptions     Pending Prescriptions Disp Refills    fluocinoNIDE (LIDEX) 0.05 % topical cream 60 g 5     Sig: Apply  to affected area two (2) times a day. Please review  before prescribing new script for this medication.      Thanks,  Anne Marie Narvaez

## 2022-05-14 ENCOUNTER — HOSPITAL ENCOUNTER (OUTPATIENT)
Dept: MAMMOGRAPHY | Age: 52
Discharge: HOME | End: 2022-05-14
Payer: MEDICAID

## 2022-05-14 DIAGNOSIS — Z12.31 ENCOUNTER FOR SCREENING MAMMOGRAM FOR MALIGNANT NEOPLASM OF BREAST: ICD-10-CM

## 2022-05-14 PROCEDURE — 77067 SCR MAMMO BI INCL CAD: CPT

## 2022-05-31 ENCOUNTER — OFFICE VISIT (OUTPATIENT)
Dept: FAMILY MEDICINE CLINIC | Age: 52
End: 2022-05-31
Payer: MEDICAID

## 2022-05-31 VITALS
SYSTOLIC BLOOD PRESSURE: 128 MMHG | DIASTOLIC BLOOD PRESSURE: 69 MMHG | BODY MASS INDEX: 29.24 KG/M2 | RESPIRATION RATE: 14 BRPM | HEIGHT: 61 IN | TEMPERATURE: 97.8 F | HEART RATE: 71 BPM | WEIGHT: 154.9 LBS

## 2022-05-31 DIAGNOSIS — L30.8 PSORIASIFORM DERMATITIS: Primary | ICD-10-CM

## 2022-05-31 DIAGNOSIS — D50.8 OTHER IRON DEFICIENCY ANEMIA: ICD-10-CM

## 2022-05-31 PROCEDURE — 99213 OFFICE O/P EST LOW 20 MIN: CPT | Performed by: FAMILY MEDICINE

## 2022-05-31 RX ORDER — LANOLIN ALCOHOL/MO/W.PET/CERES
325 CREAM (GRAM) TOPICAL 2 TIMES DAILY
Qty: 60 TABLET | Refills: 6 | Status: SHIPPED | OUTPATIENT
Start: 2022-05-31

## 2022-05-31 RX ORDER — AMMONIUM LACTATE 12 G/100G
CREAM TOPICAL 2 TIMES DAILY
Qty: 280 G | Refills: 0 | Status: SHIPPED | OUTPATIENT
Start: 2022-05-31

## 2022-05-31 RX ORDER — ERGOCALCIFEROL 1.25 MG/1
50000 CAPSULE ORAL
Qty: 4 CAPSULE | Refills: 11 | Status: SHIPPED | OUTPATIENT
Start: 2022-05-31

## 2022-05-31 NOTE — PROGRESS NOTES
Chief Complaint   Patient presents with    Hypertension     1. Have you been to the ER, urgent care clinic since your last visit? Hospitalized since your last visit? No    2. Have you seen or consulted any other health care providers outside of the [address] [place] [address] since your last visit? Include any pap smears or colon screening. No  Health Maintenance   Topic Date Due    Hepatitis C Screening  Never done    Cervical cancer screen  Never done    Shingrix Vaccine Age 50> (1 of 2) Never done    COVID-19 Vaccine (3 - Booster for Moderna series) 12/03/2021    DTaP/Tdap/Td series (2 - Td or Tdap) 07/20/2022    Flu Vaccine (Season Ended) 09/01/2022    Depression Screen  02/16/2023    Breast Cancer Screen Mammogram  05/14/2024    Lipid Screen  07/19/2024    Colorectal Cancer Screening Combo  04/19/2032    Pneumococcal 0-64 years  Aged Out       verified patient with two type of identifiers. C/o chapped, peeling  lips x 5 months,  Increased water but still having chapped lips. Using vaseline and blistex with no relief.

## 2022-05-31 NOTE — PROGRESS NOTES
HISTORY OF PRESENT ILLNESS  Anita Mcadams is a 49 y.o. female,  present for Bp check , compliant w/ the bp meds, a low salt diet, an  active life style,  today the pt denies Chest Pain, has no legs swelling no lightheadedness,    Rash of the hands and breasts, was told to have Psoriasis but no creams meds given helping her, was biopsied, does not want to take any meds till seen the derm,     Started few weeks ago not better tried alcohol washing and OTC antibiotic ointments, dosenot tingles and not pain full, states that is notexpanding red, and not  swelled up, whitish patches rounds, with itchiness,    the pat has not been feeling anxious, and  Has not been feeling stressed out, otherwise feeling better since the last visit       Current Outpatient Medications   Medication Sig Dispense Refill    fluocinoNIDE (LIDEX) 0.05 % topical cream Apply  to affected area two (2) times a day. (Patient taking differently: Apply  to affected area two (2) times daily as needed.) 60 g 5    meclizine (ANTIVERT) 25 mg tablet Take 1 Tablet by mouth three (3) times daily as needed for Dizziness. 30 Tablet 0    medroxyPROGESTERone (PROVERA) 10 mg tablet TAKE 1 TAB BY MOUTH DAILY ON DAYS 5 THROUGH 15 OF MONTH TO REGULATE MENSES      ferrous sulfate 325 mg (65 mg iron) tablet Take 1 Tab by mouth two (2) times a day. 60 Tab 6    acetaminophen (TYLENOL) 500 mg tablet Take  by mouth every six (6) hours as needed for Pain.  nystatin (MYCOSTATIN) topical cream Apply  to affected area two (2) times a day. (Patient not taking: Reported on 5/31/2022) 30 g 3    clobetasoL (TEMOVATE) 0.05 % topical cream Apply  to affected area two (2) times a day.  (Patient not taking: Reported on 5/31/2022) 45 g 1     Allergies   Allergen Reactions    Percocet [Oxycodone-Acetaminophen] Itching     Past Medical History:   Diagnosis Date    Contact dermatitis and other eczema, due to unspecified cause     GERD (gastroesophageal reflux disease)     HLMKRRLB(122.8)     [facility]     Past Surgical History:   Procedure Laterality Date    COLONOSCOPY N/A 2022    COLONOSCOPY performed by Chino Cerda MD at [facility] ENDOSCOPY    [address] [facility]  2022         HX GYN      D&C in     HX GYN       X1    HX TUBAL LIGATION       Family History   Problem Relation Age of Onset    Diabetes Mother     Hypertension Mother     Heart Disease Mother     Kidney Disease Mother         esrd on [de-identified]     Stroke Mother     Heart Disease Father     Diabetes Sister     Hypertension Sister     Stroke Sister     Heart Disease Sister     Hypertension Brother     Diabetes Brother     Heart Disease Brother     No Known Problems Child      Social History     Tobacco Use    Smoking status: Former Smoker     Packs/day: 0.25     Years: 4.00     Pack years: 1.00     Quit date: 1994     Years since quittin.4    Smokeless tobacco: Never Used    Tobacco comment: 1 pack yr   Substance Use Topics    Alcohol use: Yes     Alcohol/week: 1.0 standard drink     Types: 1 Shots of liquor per week     Comment: socially      Lab Results   Component Value Date/Time    Hemoglobin A1c 5.4 2019 12:41 PM    Hemoglobin A1c 5.6 2013 10:18 AM    Hemoglobin A1c 5.8 (H) 2012 09:43 AM    Glucose 80 2019 12:41 PM    LDL, calculated 116 (H) 2019 12:41 PM    Creatinine 0.82 2019 12:41 PM      Lab Results   Component Value Date/Time    Cholesterol, total 228 (H) 2019 12:41 PM    HDL Cholesterol 88 2019 12:41 PM    LDL, calculated 116 (H) 2019 12:41 PM    Triglyceride 118 2019 12:41 PM        Review of Systems   Constitutional: Negative for chills and fever. HENT: Negative for congestion and nosebleeds. Eyes: Negative for blurred vision and pain. Respiratory: Negative for cough, shortness of breath and wheezing.     Cardiovascular: Negative for chest pain and leg swelling. Gastrointestinal: Negative for constipation, diarrhea, nausea and vomiting. Genitourinary: Negative for dysuria and frequency. Musculoskeletal: Negative for joint pain and myalgias. Skin: Positive for itching and rash. Neurological: Negative for dizziness, loss of consciousness and headaches. Psychiatric/Behavioral: Negative for depression. The patient is not nervous/anxious and does not have insomnia. Physical Exam  Vitals and nursing note reviewed. Constitutional:       Appearance: She is well-developed. HENT:      Head: Normocephalic and atraumatic. Eyes:      Conjunctiva/sclera: Conjunctivae normal.      Pupils: Pupils are equal, round, and reactive to light. Neck:      Thyroid: No thyromegaly. Vascular: No JVD. Cardiovascular:      Rate and Rhythm: Normal rate and regular rhythm. Heart sounds: Normal heart sounds. No murmur heard. No friction rub. No gallop. Pulmonary:      Effort: Pulmonary effort is normal. No respiratory distress. Breath sounds: Normal breath sounds. No stridor. No wheezing or rales. Abdominal:      General: Bowel sounds are normal. There is no distension. Palpations: Abdomen is soft. There is no mass. Tenderness: There is no abdominal tenderness. Musculoskeletal:         General: No tenderness. Normal range of motion. Lymphadenopathy:      Cervical: No cervical adenopathy. Skin:     General: Skin is dry. Findings: Erythema and rash present. Neurological:      Mental Status: She is alert and oriented to person, place, and time. Cranial Nerves: No cranial nerve deficit. Deep Tendon Reflexes: Reflexes are normal and symmetric. Psychiatric:         Behavior: Behavior normal.         ASSESSMENT and PLAN  Diagnoses and all orders for this visit:    1. Psoriasiform dermatitis  -     ferrous sulfate 325 mg (65 mg iron) tablet; Take 1 Tablet by mouth two (2) times a day.   -     ergocalciferol (ERGOCALCIFEROL) 1,250 mcg (50,000 unit) capsule; Take 1 Capsule by mouth every seven (7) days. -     zinc 50 mg tab tablet; Take 1 Tablet by mouth daily. -     ammonium lactate (AMLACTIN) 12 % topical cream; Apply  to affected area two (2) times a day. rub in to affected area well    2. Other iron deficiency anemia  -     ferrous sulfate 325 mg (65 mg iron) tablet; Take 1 Tablet by mouth two (2) times a day. -     ergocalciferol (ERGOCALCIFEROL) 1,250 mcg (50,000 unit) capsule; Take 1 Capsule by mouth every seven (7) days. -     zinc 50 mg tab tablet; Take 1 Tablet by mouth daily. -     ammonium lactate (AMLACTIN) 12 % topical cream; Apply  to affected area two (2) times a day.  rub in to affected area well       pt was advised about the covid protection with vaccination, and to Wear a facemask , having social distance, and to get tested if possible,     patient acknowledged understanding and agreed with today's recommendations,

## 2022-09-12 DIAGNOSIS — D50.8 OTHER IRON DEFICIENCY ANEMIAS: ICD-10-CM

## 2022-09-12 DIAGNOSIS — L30.8 OTHER SPECIFIED DERMATITIS: ICD-10-CM

## 2022-09-12 RX ORDER — ZINC GLUCONATE 50 MG
TABLET ORAL
Qty: 30 TABLET | Refills: 1 | Status: SHIPPED | OUTPATIENT
Start: 2022-09-12

## 2023-02-14 RX ORDER — CLOBETASOL PROPIONATE 0.5 MG/G
CREAM TOPICAL
Qty: 45 G | Refills: 1 | Status: SHIPPED | OUTPATIENT
Start: 2023-02-14

## 2023-04-25 ENCOUNTER — OFFICE VISIT (OUTPATIENT)
Dept: URGENT CARE | Age: 53
End: 2023-04-25
Payer: MEDICAID

## 2023-04-25 ENCOUNTER — TRANSCRIBE ORDERS (OUTPATIENT)
Facility: HOSPITAL | Age: 53
End: 2023-04-25

## 2023-04-25 VITALS
OXYGEN SATURATION: 100 % | WEIGHT: 147 LBS | HEIGHT: 61 IN | HEART RATE: 66 BPM | RESPIRATION RATE: 18 BRPM | SYSTOLIC BLOOD PRESSURE: 137 MMHG | BODY MASS INDEX: 27.75 KG/M2 | TEMPERATURE: 97.9 F | DIASTOLIC BLOOD PRESSURE: 80 MMHG

## 2023-04-25 DIAGNOSIS — Z12.31 SCREENING MAMMOGRAM FOR HIGH-RISK PATIENT: Primary | ICD-10-CM

## 2023-04-25 DIAGNOSIS — F41.0 PANIC ATTACKS: ICD-10-CM

## 2023-04-25 DIAGNOSIS — F41.9 ANXIETY DISORDER, UNSPECIFIED TYPE: Primary | ICD-10-CM

## 2023-04-25 PROCEDURE — 99203 OFFICE O/P NEW LOW 30 MIN: CPT | Performed by: FAMILY MEDICINE

## 2023-04-25 RX ORDER — HYDROXYZINE 25 MG/1
25 TABLET, FILM COATED ORAL
Qty: 20 TABLET | Refills: 0 | Status: SHIPPED | OUTPATIENT
Start: 2023-04-25 | End: 2023-05-05

## 2023-04-25 RX ORDER — MELATONIN
1000 DAILY
COMMUNITY

## 2023-04-25 RX ORDER — PROPRANOLOL HYDROCHLORIDE 20 MG/1
20 TABLET ORAL
Qty: 20 TABLET | Refills: 0 | Status: SHIPPED | OUTPATIENT
Start: 2023-04-25

## 2023-04-25 NOTE — PATIENT INSTRUCTIONS
Deep slow breathing exercise 2-3 x day   Breath in brown paper bag if develop hyperventilation  Light exercise

## 2023-04-25 NOTE — PROGRESS NOTES
Anxiety  This is a new problem. The current episode started more than 2 days ago. The problem occurs daily. The problem has not changed since onset. Associated symptoms include chest pain, abdominal pain and shortness of breath. Associated symptoms comments: Palpitation - muscle tightness- abdominal pain - tingling and numbness- off and on   Not sleeping well probably triggered it   Denies any specific stress    No h/o anxiety/ panic attacks in past     . Nothing aggravates the symptoms. Nothing relieves the symptoms. She has tried Benadryl for the symptoms. The treatment provided no relief.       Past Medical History:   Diagnosis Date    Contact dermatitis and other eczema, due to unspecified cause     GERD (gastroesophageal reflux disease)     Headache(784.0)     [facility]        Past Surgical History:   Procedure Laterality Date    COLONOSCOPY N/A 2022    [facility]    COLONOSCOPY,DIAGNOSTIC  2022         HX GYN      D&C in     HX GYN       X1    HX TUBAL LIGATION           Family History   Problem Relation Age of Onset    Diabetes Mother     Hypertension Mother     Heart Disease Mother     Kidney Disease Mother         esrd on dilaysis     Stroke Mother     Heart Disease Father     Diabetes Sister     Hypertension Sister     Stroke Sister     Heart Disease Sister     Hypertension Brother     Diabetes Brother     Heart Disease Brother     No Known Problems Child         Social History     Socioeconomic History    Marital status: SINGLE     Spouse name: Not on file    Number of children: 1    Years of education: Not on file    Highest education level: Not on file   Occupational History     Comment: care giver    Tobacco Use    Smoking status: Former     Packs/day: 0.25     Years: 4.00     Pack years: 1.00     Types: Cigarettes     Quit date: 1994     Years since quittin.3    Smokeless tobacco: Never    Tobacco comments:     1 pack yr   Vaping Use    Vaping Use: Never used   Substance and Sexual Activity    Alcohol use: Yes     Alcohol/week: 1.0 standard drink     Types: 1 Shots of liquor per week     Comment: socially    Drug use: Not Currently     Types: Marijuana    Sexual activity: Not Currently     Partners: Male     Birth control/protection: Condom, Surgical     Comment: single   Other Topics Concern    Not on file   Social History Narrative    Not on file     Social Determinants of Health     Financial Resource Strain: Not on file   Food Insecurity: Not on file   Transportation Needs: Not on file   Physical Activity: Not on file   Stress: Not on file   Social Connections: Not on file   Intimate Partner Violence: Not on file   Housing Stability: Not on file                ALLERGIES: Percocet [oxycodone-acetaminophen]    Review of Systems   Constitutional:  Negative for fatigue and fever. Respiratory:  Positive for chest tightness and shortness of breath. Negative for wheezing. Cardiovascular:  Positive for chest pain and palpitations. Gastrointestinal:  Positive for abdominal pain. Psychiatric/Behavioral:  Positive for sleep disturbance. Negative for dysphoric mood. The patient is nervous/anxious. All other systems reviewed and are negative. Vitals:    04/25/23 1735   BP: 137/80   Pulse: 66   Resp: 18   Temp: 97.9 °F (36.6 °C)   SpO2: 100%   Weight: 147 lb (66.7 kg)   Height: 5' 1\" (1.549 m)       Physical Exam  Vitals and nursing note reviewed. Constitutional:       Appearance: Normal appearance. Cardiovascular:      Rate and Rhythm: Normal rate and regular rhythm. Pulses: Normal pulses. Heart sounds: Normal heart sounds. Pulmonary:      Effort: Pulmonary effort is normal.      Breath sounds: Normal breath sounds. Abdominal:      General: Abdomen is flat. Palpations: Abdomen is soft.    Musculoskeletal:      Cervical back: Normal range of motion and neck supple. Neurological:      General: No focal deficit present. Mental Status: She is alert. Psychiatric:         Mood and Affect: Mood normal.         Behavior: Behavior normal.         Thought Content: Thought content normal.         Judgment: Judgment normal.       MDM    Procedures             ICD-10-CM ICD-9-CM    1. Anxiety disorder, unspecified type  F41.9 300.00       2. Panic attacks  F41.0 300.01           Go to ED if sxs worsen/ not better  Follow with PCP- if sxs persist- need to start SSRI  Medications Ordered Today   Medications    hydrOXYzine HCL (ATARAX) 25 mg tablet     Sig: Take 1 Tablet by mouth three (3) times daily as needed for Anxiety or Sleep for up to 10 days. Dispense:  20 Tablet     Refill:  0    propranoloL (INDERAL) 20 mg tablet     Sig: Take 1 Tablet by mouth three (3) times daily as needed (palpitation/ chest pain). Dispense:  20 Tablet     Refill:  0     No results found for any visits on 04/25/23. The patients condition was discussed with the patient and they understand. The patient is to follow up with primary care doctor. If signs and symptoms become worse the pt is to go to the ER. The patient is to take medications as prescribed.

## 2023-04-26 ENCOUNTER — APPOINTMENT (OUTPATIENT)
Dept: GENERAL RADIOLOGY | Age: 53
End: 2023-04-26
Attending: EMERGENCY MEDICINE
Payer: MEDICAID

## 2023-04-26 VITALS
TEMPERATURE: 98.1 F | WEIGHT: 147 LBS | OXYGEN SATURATION: 100 % | DIASTOLIC BLOOD PRESSURE: 91 MMHG | HEART RATE: 57 BPM | RESPIRATION RATE: 18 BRPM | BODY MASS INDEX: 27.75 KG/M2 | SYSTOLIC BLOOD PRESSURE: 149 MMHG | HEIGHT: 61 IN

## 2023-04-26 LAB
ALBUMIN SERPL-MCNC: 3.6 G/DL (ref 3.5–5)
ALBUMIN/GLOB SERPL: 0.9 (ref 1.1–2.2)
ALP SERPL-CCNC: 59 U/L (ref 45–117)
ALT SERPL-CCNC: 20 U/L (ref 12–78)
ANION GAP SERPL CALC-SCNC: 7 MMOL/L (ref 5–15)
AST SERPL-CCNC: 15 U/L (ref 15–37)
BASOPHILS # BLD: 0.1 K/UL (ref 0–0.1)
BASOPHILS NFR BLD: 1 % (ref 0–1)
BILIRUB SERPL-MCNC: 0.3 MG/DL (ref 0.2–1)
BNP SERPL-MCNC: 38 PG/ML
BUN SERPL-MCNC: 10 MG/DL (ref 6–20)
BUN/CREAT SERPL: 9 (ref 12–20)
CALCIUM SERPL-MCNC: 8.9 MG/DL (ref 8.5–10.1)
CHLORIDE SERPL-SCNC: 110 MMOL/L (ref 97–108)
CO2 SERPL-SCNC: 24 MMOL/L (ref 21–32)
CREAT SERPL-MCNC: 1.1 MG/DL (ref 0.55–1.02)
DIFFERENTIAL METHOD BLD: ABNORMAL
EOSINOPHIL # BLD: 0.1 K/UL (ref 0–0.4)
EOSINOPHIL NFR BLD: 1 % (ref 0–7)
ERYTHROCYTE [DISTWIDTH] IN BLOOD BY AUTOMATED COUNT: 16.1 % (ref 11.5–14.5)
GLOBULIN SER CALC-MCNC: 3.9 G/DL (ref 2–4)
GLUCOSE SERPL-MCNC: 103 MG/DL (ref 65–100)
HCT VFR BLD AUTO: 29.1 % (ref 35–47)
HGB BLD-MCNC: 8.8 G/DL (ref 11.5–16)
IMM GRANULOCYTES # BLD AUTO: 0 K/UL (ref 0–0.04)
IMM GRANULOCYTES NFR BLD AUTO: 0 % (ref 0–0.5)
LYMPHOCYTES # BLD: 1.8 K/UL (ref 0.8–3.5)
LYMPHOCYTES NFR BLD: 26 % (ref 12–49)
MCH RBC QN AUTO: 22.8 PG (ref 26–34)
MCHC RBC AUTO-ENTMCNC: 30.2 G/DL (ref 30–36.5)
MCV RBC AUTO: 75.4 FL (ref 80–99)
MONOCYTES # BLD: 0.7 K/UL (ref 0–1)
MONOCYTES NFR BLD: 10 % (ref 5–13)
NEUTS SEG # BLD: 4.3 K/UL (ref 1.8–8)
NEUTS SEG NFR BLD: 62 % (ref 32–75)
NRBC # BLD: 0 K/UL (ref 0–0.01)
NRBC BLD-RTO: 0 PER 100 WBC
PLATELET # BLD AUTO: 329 K/UL (ref 150–400)
PMV BLD AUTO: 10.2 FL (ref 8.9–12.9)
POTASSIUM SERPL-SCNC: 3.4 MMOL/L (ref 3.5–5.1)
PROT SERPL-MCNC: 7.5 G/DL (ref 6.4–8.2)
RBC # BLD AUTO: 3.86 M/UL (ref 3.8–5.2)
SODIUM SERPL-SCNC: 141 MMOL/L (ref 136–145)
TROPONIN I SERPL HS-MCNC: 5 NG/L (ref 0–51)
WBC # BLD AUTO: 6.9 K/UL (ref 3.6–11)

## 2023-04-26 PROCEDURE — 71045 X-RAY EXAM CHEST 1 VIEW: CPT

## 2023-04-26 PROCEDURE — 84484 ASSAY OF TROPONIN QUANT: CPT

## 2023-04-26 PROCEDURE — 85025 COMPLETE CBC W/AUTO DIFF WBC: CPT

## 2023-04-26 PROCEDURE — 93005 ELECTROCARDIOGRAM TRACING: CPT

## 2023-04-26 PROCEDURE — 83880 ASSAY OF NATRIURETIC PEPTIDE: CPT

## 2023-04-26 PROCEDURE — 80053 COMPREHEN METABOLIC PANEL: CPT

## 2023-04-26 PROCEDURE — 99285 EMERGENCY DEPT VISIT HI MDM: CPT

## 2023-04-26 PROCEDURE — 36415 COLL VENOUS BLD VENIPUNCTURE: CPT

## 2023-04-27 ENCOUNTER — HOSPITAL ENCOUNTER (EMERGENCY)
Age: 53
Discharge: HOME | End: 2023-04-27
Attending: EMERGENCY MEDICINE
Payer: MEDICAID

## 2023-04-27 DIAGNOSIS — F41.0 PANIC ATTACK: Primary | ICD-10-CM

## 2023-04-27 DIAGNOSIS — F41.9 ANXIETY: ICD-10-CM

## 2023-04-27 PROCEDURE — 74011250637 HC RX REV CODE- 250/637: Performed by: EMERGENCY MEDICINE

## 2023-04-27 RX ORDER — LORAZEPAM 0.5 MG/1
0.5 TABLET ORAL
Qty: 12 TABLET | Refills: 0 | Status: SHIPPED | OUTPATIENT
Start: 2023-04-27

## 2023-04-27 RX ORDER — LORAZEPAM 1 MG/1
1 TABLET ORAL
Status: COMPLETED | OUTPATIENT
Start: 2023-04-27 | End: 2023-04-27

## 2023-04-27 RX ADMIN — LORAZEPAM 1 MG: 1 TABLET ORAL at 03:13

## 2023-04-27 NOTE — ED PROVIDER NOTES
[facility] EMERGENCY DEPT  EMERGENCY DEPARTMENT ENCOUNTER       Pt Name: Angus Rockwell  MRN: [id number]  [place] 1970  Date of evaluation: 2023  Provider: Maty Sy MD   PCP: Nik Azul MD  Note Started: 11:21 AM 23     CHIEF COMPLAINT       Chief Complaint   Patient presents with    Chest Pain     Pt presents w/ c/o CP sob on and off today. Pt reports a feeling of anxiety. HISTORY OF PRESENT ILLNESS: 1 or more elements      History From: {[facility]:50119::\"Patient\"}  {HPI Limitations (Optional):74756}     Angus Rockwell is a 47 y.o. female who presents ***  Anxiety and panic attacks  Tried hydroxyzine but it made symtpoms worse    Please see more comprehensive history below under MDM  Nursing Notes were all reviewed in real time as they are made available. Any disagreements addressed in the HPI/MDM. REVIEW OF SYSTEMS      Review of Systems     Positives and Pertinent negatives as per HPI and MDM.     PAST HISTORY     Past Medical History:  Past Medical History:   Diagnosis Date    Contact dermatitis and other eczema, due to unspecified cause     GERD (gastroesophageal reflux disease)     Headache(784.0)     Other screening mammogram 13    [facility]-Normal-repeat one yr       Past Surgical History:  Past Surgical History:   Procedure Laterality Date    COLONOSCOPY N/A 2022    COLONOSCOPY performed by Braden Chavira MD at [facility] ENDOSCOPY    COLONOSCOPY,DIAGNOSTIC  2022         HX GYN      D&C in     HX GYN       X1    HX TUBAL LIGATION         Family History:  Family History   Problem Relation Age of Onset    Diabetes Mother     Hypertension Mother     Heart Disease Mother     Kidney Disease Mother         esrd on [de-identified]     Stroke Mother     Heart Disease Father     Diabetes Sister     Hypertension Sister     Stroke Sister     Heart Disease Sister     Hypertension Brother     Diabetes Brother     Heart Disease Brother     No Known Problems Child        Social History:  Social History     Tobacco Use    Smoking status: Former     Packs/day: 0.25     Years: 4.00     Pack years: 1.00     Types: Cigarettes     Quit date: 1994     Years since quittin.3    Smokeless tobacco: Never    Tobacco comments:     1 pack yr   Vaping Use    Vaping Use: Never used   Substance Use Topics    Alcohol use: Yes     Alcohol/week: 1.0 standard drink     Types: 1 Shots of liquor per week     Comment: socially    Drug use: Not Currently     Types: Marijuana       Allergies: Allergies   Allergen Reactions    Percocet [Oxycodone-Acetaminophen] Itching       CURRENT MEDICATIONS      Discharge Medication List as of 2023  3:08 AM        CONTINUE these medications which have NOT CHANGED    Details   cholecalciferol (VITAMIN D3) (1000 Units /25 mcg) tablet Take 1 Tablet by mouth daily. , Historical Med      hydrOXYzine HCL (ATARAX) 25 mg tablet Take 1 Tablet by mouth three (3) times daily as needed for Anxiety or Sleep for up to 10 days. , Normal, Disp-20 Tablet, R-0      propranoloL (INDERAL) 20 mg tablet Take 1 Tablet by mouth three (3) times daily as needed (palpitation/ chest pain). , Normal, Disp-20 Tablet, R-0      clobetasoL (TEMOVATE) 0.05 % topical cream APPLY TO AFFECTED AREA TWICE A DAY, Normal, Disp-45 g, R-1      zinc gluconate 50 mg tablet TAKE 1 TABLET BY MOUTH EVERY DAY, Normal, Disp-30 Tablet, R-1      ferrous sulfate 325 mg (65 mg iron) tablet Take 1 Tablet by mouth two (2) times a day., Normal, Disp-60 Tablet, R-6      ergocalciferol (ERGOCALCIFEROL) 1,250 mcg (50,000 unit) capsule Take 1 Capsule by mouth every seven (7) days. , Normal, Disp-4 Capsule, R-11      zinc 50 mg tab tablet Take 1 Tablet by mouth daily. , Normal, Disp-30 Tablet, R-1      ammonium lactate (AMLACTIN) 12 % topical cream Apply  to affected area two (2) times a day.  rub in to affected area well, Normal, Disp-280 g, R-0      fluocinoNIDE (LIDEX) 0.05 % topical cream Apply  to affected area two (2) times a day., Normal, Disp-60 g, R-5      meclizine (ANTIVERT) 25 mg tablet Take 1 Tablet by mouth three (3) times daily as needed for Dizziness., Normal, Disp-30 Tablet, R-0      nystatin (MYCOSTATIN) topical cream Apply  to affected area two (2) times a day., Normal, Disp-30 g, R-3      medroxyPROGESTERone (PROVERA) 10 mg tablet TAKE 1 TAB BY MOUTH DAILY ON DAYS 5 THROUGH 15 OF MONTH TO REGULATE MENSES, Historical Med      acetaminophen (TYLENOL) 500 mg tablet Take  by mouth every six (6) hours as needed for Pain., Historical Med               PHYSICAL EXAM      ED Triage Vitals   ED Encounter Vitals Group      BP 04/26/23 2237 (!) 149/91      Pulse (Heart Rate) 04/26/23 2237 (!) 57      Resp Rate 04/26/23 2237 18      Temp 04/26/23 2237 98.1 °F (36.7 °C)      Temp src --       O2 Sat (%) 04/26/23 2237 100 %      Weight 04/26/23 2236 147 lb      Height 04/26/23 2236 5' 1\"        Physical Exam       DIAGNOSTIC RESULTS   LABS:     Recent Results (from the past 24 hour(s))   EKG, 12 LEAD, INITIAL    Collection Time: 04/26/23 10:27 PM   Result Value Ref Range    Ventricular Rate 62 BPM    Atrial Rate 62 BPM    P-R Interval 122 ms    QRS Duration 78 ms    Q-T Interval 416 ms    QTC Calculation (Bezet) 422 ms    Calculated P Axis 43 degrees    Calculated R Axis 20 degrees    Calculated T Axis 42 degrees    Diagnosis       Normal sinus rhythm  Normal ECG  When compared with ECG of 19-FEB-2018 11:49,  No significant change was found     CBC WITH AUTOMATED DIFF    Collection Time: 04/26/23 11:10 PM   Result Value Ref Range    WBC 6.9 3.6 - 11.0 K/uL    RBC 3.86 3.80 - 5.20 M/uL    HGB 8.8 (L) 11.5 - 16.0 g/dL    HCT 29.1 (L) 35.0 - 47.0 %    MCV 75.4 (L) 80.0 - 99.0 FL    MCH 22.8 (L) 26.0 - 34.0 PG    MCHC 30.2 30.0 - 36.5 g/dL    RDW 16.1 (H) 11.5 - 14.5 %    PLATELET 395 642 - 941 K/uL    MPV 10.2 8.9 - 12.9 FL    NRBC 0.0 0  WBC    ABSOLUTE NRBC 0.00 0.00 - 0.01 K/uL    NEUTROPHILS 62 32 - 75 %    LYMPHOCYTES 26 12 - 49 %    MONOCYTES 10 5 - 13 %    EOSINOPHILS 1 0 - 7 %    BASOPHILS 1 0 - 1 %    IMMATURE GRANULOCYTES 0 0.0 - 0.5 %    ABS. NEUTROPHILS 4.3 1.8 - 8.0 K/UL    ABS. LYMPHOCYTES 1.8 0.8 - 3.5 K/UL    ABS. MONOCYTES 0.7 0.0 - 1.0 K/UL    ABS. EOSINOPHILS 0.1 0.0 - 0.4 K/UL    ABS. BASOPHILS 0.1 0.0 - 0.1 K/UL    ABS. IMM. GRANS. 0.0 0.00 - 0.04 K/UL    DF AUTOMATED     METABOLIC PANEL, COMPREHENSIVE    Collection Time: 04/26/23 11:10 PM   Result Value Ref Range    Sodium 141 136 - 145 mmol/L    Potassium 3.4 (L) 3.5 - 5.1 mmol/L    Chloride 110 (H) 97 - 108 mmol/L    CO2 24 21 - 32 mmol/L    Anion gap 7 5 - 15 mmol/L    Glucose 103 (H) 65 - 100 mg/dL    BUN 10 6 - 20 MG/DL    Creatinine 1.10 (H) 0.55 - 1.02 MG/DL    BUN/Creatinine ratio 9 (L) 12 - 20      eGFR >60 >60 ml/min/1.73m2    Calcium 8.9 8.5 - 10.1 MG/DL    Bilirubin, total 0.3 0.2 - 1.0 MG/DL    ALT (SGPT) 20 12 - 78 U/L    AST (SGOT) 15 15 - 37 U/L    Alk. phosphatase 59 45 - 117 U/L    Protein, total 7.5 6.4 - 8.2 g/dL    Albumin 3.6 3.5 - 5.0 g/dL    Globulin 3.9 2.0 - 4.0 g/dL    A-G Ratio 0.9 (L) 1.1 - 2.2     NT-PRO BNP    Collection Time: 04/26/23 11:10 PM   Result Value Ref Range    NT pro-BNP 38 <125 PG/ML   TROPONIN-HIGH SENSITIVITY    Collection Time: 04/26/23 11:10 PM   Result Value Ref Range    Troponin-High Sensitivity 5 0 - 51 ng/L           RADIOLOGY:  Non-plain film images such as CT, Ultrasound and MRI are read by the radiologist. Plain radiographic images are visualized and preliminarily interpreted by the ED Provider with the below findings:     Cxr reviewed as soon as images were available. Images interpreted by me personally. No acute process identified. Final radiology read to follow and will be copied below. Rosemarie Echeverria MD       Interpretation per the Radiologist below, if available at the time of this note:     XR CHEST SNGL V   Final Result   No acute process identified.                 PROCEDURES     SCREENINGS   All screenings conducted, scored and interpreted by me. Flaca Clayton MD        CRITICAL CARE TIME            MEDICAL DECISION MAKING     Vitals:    Vitals:    04/26/23 2236 04/26/23 2237   BP:  (!) 149/91   Pulse:  (!) 57   Resp:  18   Temp:  98.1 °F (36.7 °C)   SpO2:  100%   Weight: 66.7 kg (147 lb)    Height: 5' 1\" (1.549 m)        Pertinent Chronic Medical Conditions or Prior Surgeries: Listed under PMH above and includes : na    Social Determinants affecting Dx or Tx: None    Records Reviewed: Prior medical records and Nursing notes  I reviewed and interpreted the nursing notes and and vital signs from today's visit, as well as the electronic medical record system for any external medical records that were available that may contribute to the patients current condition. Nursing notes will be reviewed and interpreted by me as they become available in realtime while the pt has been in the ED. EKG interpretation by ED physician in the absence of a cardiologist: Rhythm: sinus; and regular . Rate (approx.): 62; ST/T wave: nml; Other intervals normal. This and prior available ECGs have been viewed and interpreted by me personally. Raul Giron MD, Msc    Cardiac monitor ordered and interpreted by me. The cardiac monitor revealed normal sinus rhythm. Pulse ox monitor ordered and interpreted by me. O2 sat is 100% on ra, normaL    Continuous cardiac and pulse ox monitoring necessary to monitor patient for sings of cardiac dysrhythmia, acute hypoxemia, RR abnormalities, which patient is at risk for based on their history, risk for cardiovascular disease, pulmonary disease and/or metabolic abnormalities. CC/HPI Summary, DDx, MDM, ED Course, and Reassessment: This patient presents with symptoms consistent with acute anxiety and panic attacks. Low suspicion for acute cardiopulmonary process including ACS, PE, or thoracic aortic dissection. Denies any ingestions or any other medical complaints.  No evidence of alcohol withdrawal symptoms. Presentation not consistent with overt toxidrome, ingestion given history & physical. Presentation not consistent with organic or medical emergency at this time. No acute indication for psychiatric consultation (without SI/HI, AH/VH). Cautious return precautions discussed and patient conveys full understanding. Medical Decision Making  Amount and/or Complexity of Data Reviewed  Labs: ordered. Decision-making details documented in ED Course. Radiology: ordered and independent interpretation performed. Decision-making details documented in ED Course. ECG/medicine tests: ordered and independent interpretation performed. Decision-making details documented in ED Course. Risk  OTC drugs. Prescription drug management. Parenteral controlled substances. Drug therapy requiring intensive monitoring for toxicity. Disposition Considerations and Planning:  I have discussed with the patient and/or caregiver my initial clinical impression which is based on an evidence-based clinical evaluation of the patient and interpretation of available results. Involved patient and/or caregiver in management, treatment options and final disposition. Patient and/or caregiver verbalizes understanding and agreement. ED Medications Administered  Medications   LORazepam (ATIVAN) tablet 1 mg (1 mg Oral Given 4/27/23 0313)       ED Orders Placed:  Orders Placed This Encounter    XR CHEST SNGL V    CBC WITH AUTOMATED DIFF    METABOLIC PANEL, COMPREHENSIVE    NT-PRO BNP    TROPONIN-HIGH SENSITIVITY    OXYGEN CANNULA Liters per minute: 2; Indications for O2 therapy: CHEST PAIN CONTINUOUS STAT    EKG, 12 LEAD, INITIAL    DISCONTD: sodium chloride 0.9 % bolus infusion 1,000 mL    LORazepam (ATIVAN) tablet 1 mg    LORazepam (Ativan) 0.5 mg tablet             FINAL IMPRESSION     1. Panic attack    2.  Anxiety          DISPOSITION/PLAN     Progress note:  Patient has been reassessed and reports feeling considerably better, has normal vital signs and feels comfortable going home. I think this is reasonable as no findings today suggest a life-threatening condition. DISPOSITION: DISCHARGE  The patient's results have been reviewed with patient and available family and/or caregiver. They verbally convey their understanding and agreement of the patient's signs, symptoms, diagnosis, treatment and prognosis and additionally agree to follow up as recommended in the discharge instructions or to return to the Emergency Department should the patient's condition change prior to their follow-up appointment. The patient and available family and/or caregiver verbally agree with the care plan and all of their questions have been answered. The discharge instructions have also been provided to the them with educational information regarding the patient's diagnosis as well a list of reasons why the patient would want to return to the ER prior to their follow-up appointment should any concerns arise, the patient's condition change or symptoms worsen. Zulma Childs MD, Msc    PLAN:  Discharge Medication List as of 4/27/2023  3:08 AM        START taking these medications    Details   LORazepam (Ativan) 0.5 mg tablet Take 1 Tablet by mouth every eight (8) hours as needed for Anxiety. Max Daily Amount: 1.5 mg., Normal, Disp-12 Tablet, R-0           CONTINUE these medications which have NOT CHANGED    Details   cholecalciferol (VITAMIN D3) (1000 Units /25 mcg) tablet Take 1 Tablet by mouth daily. , Historical Med      hydrOXYzine HCL (ATARAX) 25 mg tablet Take 1 Tablet by mouth three (3) times daily as needed for Anxiety or Sleep for up to 10 days. , Normal, Disp-20 Tablet, R-0      propranoloL (INDERAL) 20 mg tablet Take 1 Tablet by mouth three (3) times daily as needed (palpitation/ chest pain). , Normal, Disp-20 Tablet, R-0      clobetasoL (TEMOVATE) 0.05 % topical cream APPLY TO AFFECTED AREA TWICE A DAY, Normal, Disp-45 g, R-1 zinc gluconate 50 mg tablet TAKE 1 TABLET BY MOUTH EVERY DAY, Normal, Disp-30 Tablet, R-1      ferrous sulfate 325 mg (65 mg iron) tablet Take 1 Tablet by mouth two (2) times a day., Normal, Disp-60 Tablet, R-6      ergocalciferol (ERGOCALCIFEROL) 1,250 mcg (50,000 unit) capsule Take 1 Capsule by mouth every seven (7) days. , Normal, Disp-4 Capsule, R-11      zinc 50 mg tab tablet Take 1 Tablet by mouth daily. , Normal, Disp-30 Tablet, R-1      ammonium lactate (AMLACTIN) 12 % topical cream Apply  to affected area two (2) times a day. rub in to affected area well, Normal, Disp-280 g, R-0      fluocinoNIDE (LIDEX) 0.05 % topical cream Apply  to affected area two (2) times a day., Normal, Disp-60 g, R-5      meclizine (ANTIVERT) 25 mg tablet Take 1 Tablet by mouth three (3) times daily as needed for Dizziness., Normal, Disp-30 Tablet, R-0      nystatin (MYCOSTATIN) topical cream Apply  to affected area two (2) times a day., Normal, Disp-30 g, R-3      medroxyPROGESTERone (PROVERA) 10 mg tablet TAKE 1 TAB BY MOUTH DAILY ON DAYS 5 THROUGH 15 OF MONTH TO REGULATE MENSES, Historical Med      acetaminophen (TYLENOL) 500 mg tablet Take  by mouth every six (6) hours as needed for Pain., Historical Med           2. Follow-up Information       Follow up With Specialties Details Why Contact Info    Nikos Mitchell MD Family Medicine Schedule an appointment as soon as possible for a visit in 2 days  [address]  [phone]      [facility] EMERGENCY DEPT Emergency Medicine  As needed, If symptoms worsen [address]  [address]  [phone]    mental health provider  Schedule an appointment as soon as possible for a visit             3.   Return to ED if worse       I am the Primary Clinician of Record. Flaca Clayton MD (electronically signed)    (Please note that parts of this dictation were completed with voice recognition software.  Quite often unanticipated grammatical, syntax, homophones, and other interpretive errors are inadvertently transcribed by the computer software. Please disregards these errors.  Please excuse any errors that have escaped final proofreading.)

## 2023-04-27 NOTE — DISCHARGE INSTRUCTIONS
It was a pleasure taking care of you in our Emergency Department today. We know that when you come to [facility], you are entrusting us with your health, comfort, and safety. Our physicians and nurses honor that trust, and truly appreciate the opportunity to care for you and your loved ones. We also value your feedback. If you receive a survey about your Emergency Department experience today, please fill it out. We care about our patients' feedback, and we listen to what you have to say.   Thank you!       --- Dr. Yenni Jones MD

## 2023-04-28 LAB
ATRIAL RATE: 62 BPM
CALCULATED P AXIS, ECG09: 43 DEGREES
CALCULATED R AXIS, ECG10: 20 DEGREES
CALCULATED T AXIS, ECG11: 42 DEGREES
DIAGNOSIS, 93000: NORMAL
P-R INTERVAL, ECG05: 122 MS
Q-T INTERVAL, ECG07: 416 MS
QRS DURATION, ECG06: 78 MS
QTC CALCULATION (BEZET), ECG08: 422 MS
VENTRICULAR RATE, ECG03: 62 BPM

## 2023-05-03 ENCOUNTER — OFFICE VISIT (OUTPATIENT)
Dept: FAMILY MEDICINE CLINIC | Age: 53
End: 2023-05-03
Payer: MEDICAID

## 2023-05-03 VITALS
DIASTOLIC BLOOD PRESSURE: 87 MMHG | BODY MASS INDEX: 27.4 KG/M2 | OXYGEN SATURATION: 96 % | TEMPERATURE: 98.3 F | RESPIRATION RATE: 20 BRPM | HEART RATE: 70 BPM | WEIGHT: 145 LBS | SYSTOLIC BLOOD PRESSURE: 139 MMHG

## 2023-05-03 DIAGNOSIS — R53.83 LOW ENERGY: ICD-10-CM

## 2023-05-03 DIAGNOSIS — F41.1 GAD (GENERALIZED ANXIETY DISORDER): Primary | ICD-10-CM

## 2023-05-03 DIAGNOSIS — R53.83 OTHER FATIGUE: ICD-10-CM

## 2023-05-03 PROCEDURE — 99214 OFFICE O/P EST MOD 30 MIN: CPT | Performed by: FAMILY MEDICINE

## 2023-05-03 RX ORDER — ESCITALOPRAM OXALATE 10 MG/1
10 TABLET ORAL DAILY
Qty: 30 TABLET | Refills: 3 | Status: SHIPPED | OUTPATIENT
Start: 2023-05-03

## 2023-05-08 ENCOUNTER — TELEPHONE (OUTPATIENT)
Age: 53
End: 2023-05-08

## 2023-05-08 NOTE — TELEPHONE ENCOUNTER
----- Message from [employer] sent at 5/8/2023  8:51 AM EDT -----  Subject: Message to Provider    QUESTIONS  Information for Provider? Patient is returning a call from the office and   would like to know the details regarding that call.  Please contact patient   at your earliest convenience regarding the details of this call.   ---------------------------------------------------------------------------  --------------  [address] [facility]  [phone]; OK to leave message on voicemail  ---------------------------------------------------------------------------  --------------  SCRIPT ANSWERS  undefined

## 2023-05-14 ENCOUNTER — OFFICE VISIT (OUTPATIENT)
Age: 53
End: 2023-05-14

## 2023-05-14 VITALS
DIASTOLIC BLOOD PRESSURE: 83 MMHG | RESPIRATION RATE: 18 BRPM | WEIGHT: 144 LBS | BODY MASS INDEX: 27.19 KG/M2 | TEMPERATURE: 98.5 F | HEART RATE: 57 BPM | HEIGHT: 61 IN | OXYGEN SATURATION: 100 % | SYSTOLIC BLOOD PRESSURE: 134 MMHG

## 2023-05-14 DIAGNOSIS — F41.8 SITUATIONAL ANXIETY: Primary | ICD-10-CM

## 2023-05-14 RX ORDER — BUPROPION HYDROCHLORIDE 150 MG/1
150 TABLET, EXTENDED RELEASE ORAL DAILY
Qty: 30 TABLET | Refills: 1 | Status: SHIPPED | OUTPATIENT
Start: 2023-05-14 | End: 2023-05-19

## 2023-05-15 ENCOUNTER — TELEPHONE (OUTPATIENT)
Age: 53
End: 2023-05-15

## 2023-05-15 ASSESSMENT — ENCOUNTER SYMPTOMS
NAUSEA: 1
RESPIRATORY NEGATIVE: 1
EYES NEGATIVE: 1

## 2023-05-15 NOTE — PROGRESS NOTES
Herve Sykes ( 1970) is a 47 y.o. female, Established Patient patient, here for evaluation of the following chief complaint(s): Anxiety (Pt here today with complaint of anxiety, presenting as panic attacks. . Had her first anxiety attack about 20 years ago, then nothing until about 3-4 weeks ago. Over the past 3-4 weeks she has been having almost daily symptoms. She reports nausea, anxiousness, shaking, headaches, racing heart. States that her son has recently begun having some panic symptoms and this has caused her to have some anxiety. Seen here about 3 weeks ago, given hydroxyzine, not helpful. Was seen in ER with negative workup)         ASSESSMENT/PLAN:  Catrina was seen today for anxiety. Diagnoses and all orders for this visit:    Situational anxiety    Other orders  -     buPROPion (WELLBUTRIN SR) 150 MG extended release tablet; Take 1 tablet by mouth daily           Return if symptoms worsen or fail to improve, for F/U at psychiatry or PCP, should see PCP, not urgent care for psych issues. .       History provided by:  Patient   used: No    Anxiety  Presents for follow-up visit. Symptoms include decreased concentration, excessive worry, insomnia, irritability, malaise, nausea, nervous/anxious behavior and restlessness. Symptoms occur most days. The severity of symptoms is moderate. The quality of sleep is poor. Nighttime awakenings: several.     Side effects of treatment include headaches. Review of Systems   Constitutional:  Positive for irritability. HENT:  Negative for ear pain and hearing loss. Eyes: Negative. Respiratory: Negative. Gastrointestinal:  Positive for nausea. Psychiatric/Behavioral:  Positive for agitation, decreased concentration and dysphoric mood. The patient is nervous/anxious and has insomnia.         Subjective:   Pt is a 47 y.o. female     Past Medical History:   Diagnosis Date    Contact dermatitis and other eczema, due to unspecified

## 2023-05-16 ENCOUNTER — HOSPITAL ENCOUNTER (OUTPATIENT)
Facility: HOSPITAL | Age: 53
Discharge: HOME | End: 2023-05-19
Payer: MEDICAID

## 2023-05-16 DIAGNOSIS — Z12.31 SCREENING MAMMOGRAM FOR HIGH-RISK PATIENT: ICD-10-CM

## 2023-05-16 PROCEDURE — 77067 SCR MAMMO BI INCL CAD: CPT

## 2023-05-19 RX ORDER — VENLAFAXINE 25 MG/1
25 TABLET ORAL 3 TIMES DAILY
Qty: 90 TABLET | Refills: 3 | Status: SHIPPED | OUTPATIENT
Start: 2023-05-19

## 2023-05-19 RX ORDER — TRAZODONE HYDROCHLORIDE 50 MG/1
50 TABLET ORAL NIGHTLY
Qty: 30 TABLET | Refills: 1 | Status: SHIPPED | OUTPATIENT
Start: 2023-05-19

## 2023-05-29 SDOH — ECONOMIC STABILITY: HOUSING INSECURITY
IN THE LAST 12 MONTHS, WAS THERE A TIME WHEN YOU DID NOT HAVE A STEADY PLACE TO SLEEP OR SLEPT IN A SHELTER (INCLUDING NOW)?: NO

## 2023-05-29 SDOH — ECONOMIC STABILITY: FOOD INSECURITY: WITHIN THE PAST 12 MONTHS, THE FOOD YOU BOUGHT JUST DIDN'T LAST AND YOU DIDN'T HAVE MONEY TO GET MORE.: NEVER TRUE

## 2023-05-29 SDOH — ECONOMIC STABILITY: INCOME INSECURITY: HOW HARD IS IT FOR YOU TO PAY FOR THE VERY BASICS LIKE FOOD, HOUSING, MEDICAL CARE, AND HEATING?: SOMEWHAT HARD

## 2023-05-29 SDOH — ECONOMIC STABILITY: TRANSPORTATION INSECURITY
IN THE PAST 12 MONTHS, HAS LACK OF TRANSPORTATION KEPT YOU FROM MEETINGS, WORK, OR FROM GETTING THINGS NEEDED FOR DAILY LIVING?: NO

## 2023-05-29 SDOH — ECONOMIC STABILITY: FOOD INSECURITY: WITHIN THE PAST 12 MONTHS, YOU WORRIED THAT YOUR FOOD WOULD RUN OUT BEFORE YOU GOT MONEY TO BUY MORE.: SOMETIMES TRUE

## 2023-06-01 ENCOUNTER — OFFICE VISIT (OUTPATIENT)
Age: 53
End: 2023-06-01
Payer: MEDICAID

## 2023-06-01 VITALS
SYSTOLIC BLOOD PRESSURE: 115 MMHG | TEMPERATURE: 98.5 F | HEART RATE: 85 BPM | BODY MASS INDEX: 26.26 KG/M2 | WEIGHT: 139 LBS | RESPIRATION RATE: 16 BRPM | OXYGEN SATURATION: 97 % | DIASTOLIC BLOOD PRESSURE: 75 MMHG

## 2023-06-01 DIAGNOSIS — F41.1 GENERALIZED ANXIETY DISORDER: Primary | ICD-10-CM

## 2023-06-01 PROCEDURE — 99213 OFFICE O/P EST LOW 20 MIN: CPT | Performed by: FAMILY MEDICINE

## 2023-06-01 RX ORDER — TRAZODONE HYDROCHLORIDE 50 MG/1
25 TABLET ORAL NIGHTLY
Qty: 30 TABLET | Refills: 1
Start: 2023-06-01

## 2023-07-01 DIAGNOSIS — L20.82 FLEXURAL ECZEMA: ICD-10-CM

## 2023-07-17 ENCOUNTER — NURSE TRIAGE (OUTPATIENT)
Dept: OTHER | Facility: CLINIC | Age: 53
End: 2023-07-17

## 2023-07-17 ENCOUNTER — TELEPHONE (OUTPATIENT)
Age: 53
End: 2023-07-17

## 2023-07-17 NOTE — TELEPHONE ENCOUNTER
E[facility] called states patient need to be seen today or tomorrow for anxiety and heart palpitation I gave her an appointment for 7/20 if this is not a good date for patient please give her a call @ [phone]

## 2023-07-17 NOTE — TELEPHONE ENCOUNTER
Location of patient: [address] [facility] [place] call from [address] at [facility] with School Admissions. Subjective: Caller states anxiety is worse over the weekend  Therapist prescribed new medication clonidine,for anxiety  ordered at HS with b/p parameters     Current Symptoms: on Saturday b/p ,94/60,was feeling dizzy /faint,pharm recommended to hold it  Took med last night because B/P was normal   Woke up with heart palpitations heart rate was high   Anxiety level 6/10 at present   Crying more   Onset: April , over the weekend worse     Associated Symptoms: NA    Pain Severity: Denies    Temperature: Denies     What has been tried:     LMP:  7/4/23  Pregnant: No    Recommended disposition: See in Office Today    Care advice provided, patient verbalizes understanding; denies any other questions or concerns; instructed to call back for any new or worsening symptoms. Patient/Caller agrees with recommended disposition; writer provided warm transfer to [facility] at [facility] for appointment scheduling    Attention Provider: Thank you for allowing me to participate in the care of your patient. The patient was connected to triage in response to information provided to the E[facility]. Please do not respond through this encounter as the response is not directed to a shared pool.      Reason for Disposition   Skipped or extra beat(s) and occurs 4 or more times per minute    Protocols used: Heart Rate and Heartbeat Questions-ADULT-[place]

## 2023-07-20 ENCOUNTER — OFFICE VISIT (OUTPATIENT)
Age: 53
End: 2023-07-20
Payer: MEDICAID

## 2023-07-20 VITALS
DIASTOLIC BLOOD PRESSURE: 71 MMHG | RESPIRATION RATE: 16 BRPM | WEIGHT: 134.6 LBS | BODY MASS INDEX: 25.43 KG/M2 | HEART RATE: 80 BPM | OXYGEN SATURATION: 96 % | TEMPERATURE: 98.3 F | SYSTOLIC BLOOD PRESSURE: 117 MMHG

## 2023-07-20 DIAGNOSIS — R00.2 PALPITATIONS: Primary | ICD-10-CM

## 2023-07-20 DIAGNOSIS — F41.0 PANIC ANXIETY SYNDROME: ICD-10-CM

## 2023-07-20 DIAGNOSIS — F41.1 GENERALIZED ANXIETY DISORDER: ICD-10-CM

## 2023-07-20 PROCEDURE — 99214 OFFICE O/P EST MOD 30 MIN: CPT | Performed by: FAMILY MEDICINE

## 2023-07-20 PROCEDURE — 93010 ELECTROCARDIOGRAM REPORT: CPT | Performed by: FAMILY MEDICINE

## 2023-07-20 PROCEDURE — 93005 ELECTROCARDIOGRAM TRACING: CPT | Performed by: FAMILY MEDICINE

## 2023-07-20 RX ORDER — CLONAZEPAM 0.5 MG/1
0.25 TABLET ORAL 2 TIMES DAILY PRN
Qty: 30 TABLET | Refills: 0 | Status: SHIPPED | OUTPATIENT
Start: 2023-07-20 | End: 2023-08-19

## 2023-07-20 ASSESSMENT — PATIENT HEALTH QUESTIONNAIRE - PHQ9
SUM OF ALL RESPONSES TO PHQ QUESTIONS 1-9: 2
1. LITTLE INTEREST OR PLEASURE IN DOING THINGS: 0
SUM OF ALL RESPONSES TO PHQ QUESTIONS 1-9: 2
SUM OF ALL RESPONSES TO PHQ QUESTIONS 1-9: 2
2. FEELING DOWN, DEPRESSED OR HOPELESS: 2
SUM OF ALL RESPONSES TO PHQ QUESTIONS 1-9: 2
SUM OF ALL RESPONSES TO PHQ9 QUESTIONS 1 & 2: 2

## 2023-07-26 DIAGNOSIS — D50.8 OTHER IRON DEFICIENCY ANEMIAS: ICD-10-CM

## 2023-07-26 DIAGNOSIS — L30.8 OTHER SPECIFIED DERMATITIS: ICD-10-CM

## 2023-07-27 NOTE — PROGRESS NOTES
Norberto Kumar (:  1970) is a 47 y.o. female,Established patient, here for evaluation of the following chief complaint(s): Anxiety (C/o increasing anxiety with heart palpitations)    patient presents for follow-up regarding the anxiety and panic states of mind, for which the patient gets sudden periods of intense tingling sensation of the left arm and legs,  include palpitations, sweating, shaking, shortness of breath, numbness, and then pt feels like a feeling that something bad is going to happen. Pt states that these sometimes happens within minutes, usually  they last for about few min  but each time duration varies from seconds to hours, then worsens by a fear of losing control and getting chest pain thinking that is either heart related or into a stroke level,   Pt not on any Etoh nor illicit drug never abused any prescription medications currently have to tablets left from previous prescription, patient is not asthmatic and has no hx of sudden death, nor early hx of heart dz in the family but recently lost few family member due to Covid-19, requesting a refill for this serious medical condition      Constitutional: no chills and fever,  , nad     HENT: no ear pain or nosebleeds. No blurred vision  Respiratory: no shortness of breath, wheezing cough   Cardiovascular: Has no chest pain, ,and racing heart . Gastrointestinal: No constipation, diarrhea, nausea and vomiting. Genitourinary: No frequency. Musculoskeletal: Negative for joint pain. Skin: no itching, no rash. Neurological: Negative for dizziness, no tremors  Psychiatric/Behavioral: +++ for depression  ++nervous/anxious.          General:  alert cooperative appears stated for the age  HEENT; normocephalic and atraumatic PERRLA extraocular motor intact normal tympanic membrane normal external ear bilaterally, mucosal normal no drainage  Neck: supple no adenopathy no JVD no bruit  Lungs: Clear to auscultation bilaterally no rales rhonchi

## 2023-07-27 NOTE — TELEPHONE ENCOUNTER
Patient states that she need a RX refill on ergocalciferol (Vitamin D2) 1,250 mcg (50,000 unit) capsule she can be reached [phone]

## 2023-07-28 RX ORDER — ERGOCALCIFEROL 1.25 MG/1
50000 CAPSULE ORAL
Qty: 12 CAPSULE | Refills: 0 | Status: SHIPPED | OUTPATIENT
Start: 2023-07-28

## 2023-08-02 RX ORDER — ERGOCALCIFEROL 1.25 MG/1
CAPSULE ORAL
Qty: 4 CAPSULE | Refills: 11 | OUTPATIENT
Start: 2023-08-02

## 2023-08-02 NOTE — TELEPHONE ENCOUNTER
Duplicate      For Pharmacy Admin Tracking Only    Program: Medication Refill  CPA in place:    Recommendation Provided To:    Intervention Detail: Discontinued Rx: 1, reason: Duplicate Therapy  Intervention Accepted By:   Garcia Newman Closed?:    Time Spent (min): 5

## 2023-08-09 ENCOUNTER — OFFICE VISIT (OUTPATIENT)
Age: 53
End: 2023-08-09
Payer: MEDICAID

## 2023-08-09 VITALS
BODY MASS INDEX: 25.51 KG/M2 | WEIGHT: 135 LBS | DIASTOLIC BLOOD PRESSURE: 74 MMHG | RESPIRATION RATE: 16 BRPM | OXYGEN SATURATION: 98 % | TEMPERATURE: 98.6 F | SYSTOLIC BLOOD PRESSURE: 120 MMHG | HEART RATE: 61 BPM

## 2023-08-09 DIAGNOSIS — F41.0 PANIC ANXIETY SYNDROME: Primary | ICD-10-CM

## 2023-08-09 DIAGNOSIS — R63.0 LACK OF APPETITE: ICD-10-CM

## 2023-08-09 DIAGNOSIS — R53.83 OTHER FATIGUE: ICD-10-CM

## 2023-08-09 PROCEDURE — 99213 OFFICE O/P EST LOW 20 MIN: CPT | Performed by: FAMILY MEDICINE

## 2023-08-09 RX ORDER — FLUOXETINE HYDROCHLORIDE 20 MG/1
CAPSULE ORAL DAILY
COMMUNITY
Start: 2023-08-03

## 2023-08-09 RX ORDER — GABAPENTIN 100 MG/1
CAPSULE ORAL
COMMUNITY
Start: 2023-08-07

## 2023-08-09 RX ORDER — METHYLPREDNISOLONE 4 MG/1
TABLET ORAL
Qty: 21 TABLET | Refills: 0 | Status: SHIPPED | OUTPATIENT
Start: 2023-08-09

## 2023-08-09 RX ORDER — METHYLPREDNISOLONE 4 MG/1
TABLET ORAL
Qty: 21 TABLET | Refills: 0 | Status: SHIPPED | OUTPATIENT
Start: 2023-08-09 | End: 2023-08-09 | Stop reason: SDUPTHER

## 2023-08-09 ASSESSMENT — PATIENT HEALTH QUESTIONNAIRE - PHQ9
2. FEELING DOWN, DEPRESSED OR HOPELESS: 0
SUM OF ALL RESPONSES TO PHQ QUESTIONS 1-9: 0
SUM OF ALL RESPONSES TO PHQ9 QUESTIONS 1 & 2: 0
SUM OF ALL RESPONSES TO PHQ QUESTIONS 1-9: 0
SUM OF ALL RESPONSES TO PHQ QUESTIONS 1-9: 0
1. LITTLE INTEREST OR PLEASURE IN DOING THINGS: 0
SUM OF ALL RESPONSES TO PHQ QUESTIONS 1-9: 0

## 2023-08-09 NOTE — PROGRESS NOTES
Ike Corbett (:  1970) is a 47 y.o. female,Established patient, here for evaluation of the following chief complaint(s):  Follow-up (Anxiety )  Tried othe meds include Benzo but not taken any of them,done counseling, able to do her job, but does not want to take any meds, currently taking gabapentin 200mg nightly, was told to take prozac not liking it,  patient at this time has ns/nh,ni,nh, but has trouble with weight gain, and all together a safe feeling at home and at work, and has no appetite likes to be medicated for wt gain,       Constitutional: no chills and fever,  , nad     HENT: no ear pain or nosebleeds. No blurred vision  Respiratory: no shortness of breath, wheezing cough   Cardiovascular: Has no chest pain, ,and racing heart . Gastrointestinal: No constipation, diarrhea, nausea and vomiting. Genitourinary: No frequency. Musculoskeletal: Negative for joint pain. Skin: no itching, no rash. Neurological: Negative for dizziness, no tremors  Psychiatric/Behavioral: +++ for depression  ++nervous/anxious. General:  alert cooperative appears stated for the age  HEENT; normocephalic and atraumatic PERRLA extraocular motor intact normal tympanic membrane normal external ear bilaterally, mucosal normal no drainage  Neck: supple no adenopathy no JVD no bruit  Lungs: Clear to auscultation bilaterally no rales rhonchi or wheezes  Heart: Normal regular S1-S2 ,  no murmur  Breast exam deferred  Abdomen: Soft nontender normal bowel sounds   Extremities: Normal range of motion no point tenderness normal pulses no edema  Pelvic/: No lesion, no lymphadenopathy  Skin: Normal no lesion no rash         ASSESSMENT/PLAN:  1. Panic anxiety syndrome  -     methylPREDNISolone (MEDROL DOSEPACK) 4 MG tablet; Take by mouth., Disp-21 tablet, R-0Normal  2. Other fatigue  -     methylPREDNISolone (MEDROL DOSEPACK) 4 MG tablet; Take by mouth., Disp-21 tablet, R-0Normal  3.  Lack of appetite  -

## 2023-10-09 ENCOUNTER — OFFICE VISIT (OUTPATIENT)
Age: 53
End: 2023-10-09
Payer: MEDICAID

## 2023-10-09 VITALS
SYSTOLIC BLOOD PRESSURE: 126 MMHG | WEIGHT: 140 LBS | OXYGEN SATURATION: 99 % | TEMPERATURE: 98.7 F | HEART RATE: 77 BPM | BODY MASS INDEX: 26.45 KG/M2 | RESPIRATION RATE: 18 BRPM | DIASTOLIC BLOOD PRESSURE: 82 MMHG

## 2023-10-09 DIAGNOSIS — F41.1 GENERALIZED ANXIETY DISORDER: ICD-10-CM

## 2023-10-09 DIAGNOSIS — R73.03 PREDIABETES: ICD-10-CM

## 2023-10-09 DIAGNOSIS — D50.8 OTHER IRON DEFICIENCY ANEMIA: ICD-10-CM

## 2023-10-09 DIAGNOSIS — R53.83 OTHER FATIGUE: ICD-10-CM

## 2023-10-09 DIAGNOSIS — F41.0 PANIC ANXIETY SYNDROME: Primary | ICD-10-CM

## 2023-10-09 DIAGNOSIS — E78.00 HYPERCHOLESTEROLEMIA: ICD-10-CM

## 2023-10-09 LAB
25(OH)D3 SERPL-MCNC: 48.9 NG/ML (ref 30–100)
ALBUMIN SERPL-MCNC: 3.4 G/DL (ref 3.5–5)
ALBUMIN/GLOB SERPL: 0.9 (ref 1.1–2.2)
ALP SERPL-CCNC: 52 U/L (ref 45–117)
ALT SERPL-CCNC: 25 U/L (ref 12–78)
ANION GAP SERPL CALC-SCNC: 5 MMOL/L (ref 5–15)
AST SERPL-CCNC: 15 U/L (ref 15–37)
BILIRUB SERPL-MCNC: 0.3 MG/DL (ref 0.2–1)
BUN SERPL-MCNC: 12 MG/DL (ref 6–20)
BUN/CREAT SERPL: 13 (ref 12–20)
CALCIUM SERPL-MCNC: 9.1 MG/DL (ref 8.5–10.1)
CHLORIDE SERPL-SCNC: 109 MMOL/L (ref 97–108)
CHOLEST SERPL-MCNC: 181 MG/DL
CO2 SERPL-SCNC: 24 MMOL/L (ref 21–32)
CREAT SERPL-MCNC: 0.96 MG/DL (ref 0.55–1.02)
ERYTHROCYTE [DISTWIDTH] IN BLOOD BY AUTOMATED COUNT: 17.2 % (ref 11.5–14.5)
EST. AVERAGE GLUCOSE BLD GHB EST-MCNC: 117 MG/DL
GLOBULIN SER CALC-MCNC: 4 G/DL (ref 2–4)
GLUCOSE SERPL-MCNC: 94 MG/DL (ref 65–100)
HBA1C MFR BLD: 5.7 % (ref 4–5.6)
HCT VFR BLD AUTO: 28 % (ref 35–47)
HDLC SERPL-MCNC: 91 MG/DL
HDLC SERPL: 2 (ref 0–5)
HGB BLD-MCNC: 7.8 G/DL (ref 11.5–16)
LDLC SERPL CALC-MCNC: 75.6 MG/DL (ref 0–100)
MCH RBC QN AUTO: 19.6 PG (ref 26–34)
MCHC RBC AUTO-ENTMCNC: 27.9 G/DL (ref 30–36.5)
MCV RBC AUTO: 70.5 FL (ref 80–99)
NRBC # BLD: 0 K/UL (ref 0–0.01)
NRBC BLD-RTO: 0 PER 100 WBC
PLATELET # BLD AUTO: 259 K/UL (ref 150–400)
PMV BLD AUTO: 10.5 FL (ref 8.9–12.9)
POTASSIUM SERPL-SCNC: 4.8 MMOL/L (ref 3.5–5.1)
PROT SERPL-MCNC: 7.4 G/DL (ref 6.4–8.2)
RBC # BLD AUTO: 3.97 M/UL (ref 3.8–5.2)
SODIUM SERPL-SCNC: 138 MMOL/L (ref 136–145)
TRIGL SERPL-MCNC: 72 MG/DL
VLDLC SERPL CALC-MCNC: 14.4 MG/DL
WBC # BLD AUTO: 5.8 K/UL (ref 3.6–11)

## 2023-10-09 PROCEDURE — 99214 OFFICE O/P EST MOD 30 MIN: CPT | Performed by: FAMILY MEDICINE

## 2023-10-09 RX ORDER — QUETIAPINE FUMARATE 25 MG/1
25 TABLET, FILM COATED ORAL NIGHTLY
Qty: 30 TABLET | Refills: 3
Start: 2023-10-09

## 2023-10-09 RX ORDER — LAMOTRIGINE 25 MG/1
12.5 TABLET ORAL NIGHTLY
Qty: 30 TABLET | Refills: 3
Start: 2023-10-09

## 2023-10-09 ASSESSMENT — PATIENT HEALTH QUESTIONNAIRE - PHQ9
SUM OF ALL RESPONSES TO PHQ QUESTIONS 1-9: 0
2. FEELING DOWN, DEPRESSED OR HOPELESS: 0
SUM OF ALL RESPONSES TO PHQ QUESTIONS 1-9: 0
SUM OF ALL RESPONSES TO PHQ QUESTIONS 1-9: 0
SUM OF ALL RESPONSES TO PHQ9 QUESTIONS 1 & 2: 0
SUM OF ALL RESPONSES TO PHQ QUESTIONS 1-9: 0
1. LITTLE INTEREST OR PLEASURE IN DOING THINGS: 0

## 2023-10-10 LAB — LAMOTRIGINE SERPL-MCNC: 1.5 UG/ML (ref 2–20)

## 2023-10-19 ENCOUNTER — TELEPHONE (OUTPATIENT)
Age: 53
End: 2023-10-19

## 2023-10-19 NOTE — TELEPHONE ENCOUNTER
Returned call to patient advised needs to go to Urgent Care if symptoms return. She currently has no symptoms but will go to Urgent Care or ER if symptoms return.

## 2023-10-19 NOTE — TELEPHONE ENCOUNTER
Patient would like a return call regarding having dizziness and faint feeling. She said it has been going on for 2 day's.   Please give her a call @ [phone]

## 2023-11-09 ENCOUNTER — OFFICE VISIT (OUTPATIENT)
Age: 53
End: 2023-11-09
Payer: MEDICAID

## 2023-11-09 VITALS
HEIGHT: 61 IN | OXYGEN SATURATION: 97 % | BODY MASS INDEX: 26.06 KG/M2 | DIASTOLIC BLOOD PRESSURE: 77 MMHG | WEIGHT: 138 LBS | HEART RATE: 81 BPM | SYSTOLIC BLOOD PRESSURE: 124 MMHG | TEMPERATURE: 97 F | RESPIRATION RATE: 18 BRPM

## 2023-11-09 DIAGNOSIS — D51.8 OTHER VITAMIN B12 DEFICIENCY ANEMIA: ICD-10-CM

## 2023-11-09 DIAGNOSIS — D50.8 IRON DEFICIENCY ANEMIA SECONDARY TO INADEQUATE DIETARY IRON INTAKE: Primary | ICD-10-CM

## 2023-11-09 PROCEDURE — PBSHW PBB SHADOW CHARGE: Performed by: FAMILY MEDICINE

## 2023-11-09 PROCEDURE — 99213 OFFICE O/P EST LOW 20 MIN: CPT | Performed by: FAMILY MEDICINE

## 2023-11-09 RX ORDER — CYANOCOBALAMIN 1000 UG/ML
1000 INJECTION, SOLUTION INTRAMUSCULAR; SUBCUTANEOUS ONCE
Status: COMPLETED | OUTPATIENT
Start: 2023-11-09 | End: 2023-11-09

## 2023-11-09 RX ORDER — FERROUS SULFATE 325(65) MG
325 TABLET ORAL
Qty: 90 TABLET | Refills: 1
Start: 2023-11-09

## 2023-11-09 RX ADMIN — CYANOCOBALAMIN 1000 MCG: 1000 INJECTION, SOLUTION INTRAMUSCULAR at 12:42

## 2023-11-09 SDOH — ECONOMIC STABILITY: FOOD INSECURITY: WITHIN THE PAST 12 MONTHS, THE FOOD YOU BOUGHT JUST DIDN'T LAST AND YOU DIDN'T HAVE MONEY TO GET MORE.: NEVER TRUE

## 2023-11-09 SDOH — ECONOMIC STABILITY: FOOD INSECURITY: WITHIN THE PAST 12 MONTHS, YOU WORRIED THAT YOUR FOOD WOULD RUN OUT BEFORE YOU GOT MONEY TO BUY MORE.: NEVER TRUE

## 2023-11-09 SDOH — ECONOMIC STABILITY: INCOME INSECURITY: HOW HARD IS IT FOR YOU TO PAY FOR THE VERY BASICS LIKE FOOD, HOUSING, MEDICAL CARE, AND HEATING?: NOT HARD AT ALL

## 2023-11-09 ASSESSMENT — PATIENT HEALTH QUESTIONNAIRE - PHQ9
SUM OF ALL RESPONSES TO PHQ QUESTIONS 1-9: 0
SUM OF ALL RESPONSES TO PHQ9 QUESTIONS 1 & 2: 0
SUM OF ALL RESPONSES TO PHQ QUESTIONS 1-9: 0
1. LITTLE INTEREST OR PLEASURE IN DOING THINGS: 0
2. FEELING DOWN, DEPRESSED OR HOPELESS: 0
SUM OF ALL RESPONSES TO PHQ QUESTIONS 1-9: 0
SUM OF ALL RESPONSES TO PHQ QUESTIONS 1-9: 0

## 2023-11-09 NOTE — PROGRESS NOTES
Vivek Hi (:  1970) is a 47 y.o. female,Established patient, here for evaluation of the following chief complaint(s):  Dizziness    New meds seroquel and lamictal, not eating much but mainly ice craving  Not working,   Hemoglobin A1C 4.0 - 5.6 % 5.7 High         Constitutional: no chills and fever,  , nad     HENT: no ear pain or nosebleeds. No blurred vision  Respiratory: no shortness of breath, wheezing cough   Cardiovascular: Has no chest pain, ,and racing heart . Gastrointestinal: No constipation, diarrhea, nausea and vomiting. Genitourinary: No frequency. Musculoskeletal: Negative for joint pain. Skin: no itching, no rash. Neurological: Negative for dizziness, no tremors  Psychiatric/Behavioral: Negative for depression, is not nervous/anxious. General:  alert cooperative appears stated for the age  HEENT; normocephalic and atraumatic PERRLA extraocular motor intact normal tympanic membrane normal external ear bilaterally, mucosal normal no drainage  Neck: supple no adenopathy no JVD no bruit  Lungs: Clear to auscultation bilaterally no rales rhonchi or wheezes  Heart: Normal regular S1-S2 ,  no murmur  Breast exam deferred  Abdomen: Soft nontender normal bowel sounds   Extremities: Normal range of motion no point tenderness normal pulses no edema  Pelvic/: No lesion, no lymphadenopathy  Skin: Normal no lesion no rash       ASSESSMENT/PLAN:  1. Iron deficiency anemia secondary to inadequate dietary iron intake  -     CBC; Future  -     Ferritin; Future  -     ferrous sulfate (IRON 325) 325 (65 Fe) MG tablet; Take 1 tablet by mouth 3 times daily (with meals), Disp-90 tablet, R-1NO PRINT  -     Janelle Starks MD, Hematology/Oncology, [place]  -     cyanocobalamin injection 1,000 mcg; 1,000 mcg, IntraMUSCular, ONCE, 1 dose, On Thu 23 at 1300  2. Other vitamin B12 deficiency anemia  -     CBC; Future  -     Ferritin;  Future  -     ferrous sulfate (IRON 325) 325 (65

## 2023-11-09 NOTE — PATIENT INSTRUCTIONS
fluttering in your chest, increased thirst or urination, numbness or tingling, muscle weakness or limp feeling. Common side effects may include:  diarrhea; or  swelling anywhere in your body. This is not a complete list of side effects and others may occur. Call your doctor for medical advice about side effects. You may report side effects to FDA at 2-832-DST-0746. What other drugs will affect cyanocobalamin injection? Other drugs may affect cyanocobalamin, including prescription and over-the-counter medicines, vitamins, and herbal products. Tell your doctor about all your current medicines and any medicine you start or stop using. Where can I get more information? Your pharmacist can provide more information about cyanocobalamin injection. Remember, keep this and all other medicines out of the reach of children, never share your medicines with others, and use this medication only for the indication prescribed. Every effort has been made to ensure that the information provided by [address] [place] [address] [employer] is accurate, up-to-date, and complete, but no guarantee is made to that effect. Drug information contained herein may be time sensitive. [employer] information has been compiled for use by healthcare practitioners and consumers in the [facility] and therefore [employer] does not warrant that uses outside of the [facility] are appropriate, unless specifically indicated otherwise. [employer] drug information does not endorse drugs, diagnose patients or recommend therapy. [employer] drug information is an informational resource designed to assist licensed healthcare practitioners in caring for their patients and/or to serve consumers viewing this service as a supplement to, and not a substitute for, the expertise, skill, knowledge and judgment of healthcare practitioners.  The absence of a warning for a given drug or drug combination in no way should be construed to indicate that the drug or drug combination is

## 2023-11-27 ENCOUNTER — OFFICE VISIT (OUTPATIENT)
Age: 53
End: 2023-11-27
Payer: MEDICAID

## 2023-11-27 VITALS
RESPIRATION RATE: 18 BRPM | SYSTOLIC BLOOD PRESSURE: 141 MMHG | HEART RATE: 64 BPM | DIASTOLIC BLOOD PRESSURE: 88 MMHG | TEMPERATURE: 98 F | BODY MASS INDEX: 26.64 KG/M2 | WEIGHT: 141 LBS

## 2023-11-27 DIAGNOSIS — D51.8 OTHER VITAMIN B12 DEFICIENCY ANEMIA: ICD-10-CM

## 2023-11-27 DIAGNOSIS — D50.8 IRON DEFICIENCY ANEMIA SECONDARY TO INADEQUATE DIETARY IRON INTAKE: ICD-10-CM

## 2023-11-27 LAB
ERYTHROCYTE [DISTWIDTH] IN BLOOD BY AUTOMATED COUNT: 23.6 % (ref 11.5–14.5)
FERRITIN SERPL-MCNC: 5 NG/ML (ref 8–252)
HCT VFR BLD AUTO: 32.5 % (ref 35–47)
HGB BLD-MCNC: 9.2 G/DL (ref 11.5–16)
MCH RBC QN AUTO: 20.3 PG (ref 26–34)
MCHC RBC AUTO-ENTMCNC: 28.3 G/DL (ref 30–36.5)
MCV RBC AUTO: 71.6 FL (ref 80–99)
NRBC # BLD: 0 K/UL (ref 0–0.01)
NRBC BLD-RTO: 0 PER 100 WBC
PLATELET # BLD AUTO: 344 K/UL (ref 150–400)
PMV BLD AUTO: 10.7 FL (ref 8.9–12.9)
RBC # BLD AUTO: 4.54 M/UL (ref 3.8–5.2)
WBC # BLD AUTO: 5.5 K/UL (ref 3.6–11)

## 2023-11-27 PROCEDURE — 99211 OFF/OP EST MAY X REQ PHY/QHP: CPT | Performed by: FAMILY MEDICINE

## 2023-11-27 PROCEDURE — PBSHW PBB SHADOW CHARGE: Performed by: FAMILY MEDICINE

## 2023-11-27 RX ORDER — CYANOCOBALAMIN 1000 UG/ML
1000 INJECTION, SOLUTION INTRAMUSCULAR; SUBCUTANEOUS ONCE
Status: COMPLETED | OUTPATIENT
Start: 2023-11-27 | End: 2023-11-27

## 2023-11-27 RX ADMIN — CYANOCOBALAMIN 1000 MCG: 1000 INJECTION, SOLUTION INTRAMUSCULAR at 09:58

## 2023-11-27 NOTE — PATIENT INSTRUCTIONS
Patient Education        cyanocobalamin (injection)  Pronunciation:  sabrina ABREU oh koe BAL a min  Brand:  Vitamin B12, Vitamin B-12  What is the most important information I should know about cyanocobalamin injection? You should not use this medicine if you are allergic to cobalt, or if you have Kelsey's disease. What is cyanocobalamin injection ? Cyanocobalamin is used to treat vitamin B12 deficiency in people with pernicious anemia and other conditions. Cyanocobalamin may also be used for purposes not listed in this medication guide. What should I discuss with my healthcare provider before using cyanocobalamin injection? You should not use this medicine if you are allergic to cyanocobalamin or cobalt, or if you have Kelsey's disease (an inherited form of vision loss). Cyanocobalamin can lead to optic nerve damage (and possibly blindness) in people with Kelsey's disease. Tell your doctor if you have ever had:  eye problems or Kelsey's disease (in you or a family member);  kidney or liver disease;  iron or folic acid deficiency;  any type of infection; or  if you are receiving any medication or treatment that affects bone marrow. Tell your doctor if you are pregnant or breastfeeding. Your dose needs may be different during pregnancy or while you are nursing. How is cyanocobalamin injection given? Follow all directions on your prescription label and read all medication guides or instruction sheets. Use the medicine exactly as directed. Cyanocobalamin injection is injected into a muscle or under the skin. A healthcare provider may teach you how to properly use the medication by yourself. Your dose needs may change if you become pregnant, if you breastfeed, or if you eat a vegetarian diet. Tell your doctor about any changes in your diet or medical condition. Always follow directions on the medicine label about giving cyanocobalamin to a child.   Your child's dose will depend on age, weight, diet, and other

## 2023-12-14 ENCOUNTER — OFFICE VISIT (OUTPATIENT)
Age: 53
End: 2023-12-14
Payer: MEDICAID

## 2023-12-14 VITALS
OXYGEN SATURATION: 98 % | HEART RATE: 81 BPM | DIASTOLIC BLOOD PRESSURE: 92 MMHG | RESPIRATION RATE: 18 BRPM | SYSTOLIC BLOOD PRESSURE: 133 MMHG | BODY MASS INDEX: 27.19 KG/M2 | WEIGHT: 144 LBS | HEIGHT: 61 IN | TEMPERATURE: 97.4 F

## 2023-12-14 DIAGNOSIS — D50.0 IRON DEFICIENCY ANEMIA DUE TO CHRONIC BLOOD LOSS: Primary | ICD-10-CM

## 2023-12-14 PROCEDURE — 99204 OFFICE O/P NEW MOD 45 MIN: CPT | Performed by: STUDENT IN AN ORGANIZED HEALTH CARE EDUCATION/TRAINING PROGRAM

## 2023-12-14 RX ORDER — SODIUM CHLORIDE 9 MG/ML
5-250 INJECTION, SOLUTION INTRAVENOUS PRN
OUTPATIENT
Start: 2023-12-28

## 2023-12-14 RX ORDER — EPINEPHRINE 1 MG/ML
0.3 INJECTION, SOLUTION, CONCENTRATE INTRAVENOUS PRN
OUTPATIENT
Start: 2023-12-28

## 2023-12-14 RX ORDER — ONDANSETRON 2 MG/ML
8 INJECTION INTRAMUSCULAR; INTRAVENOUS
OUTPATIENT
Start: 2023-12-28

## 2023-12-14 RX ORDER — HEPARIN 100 UNIT/ML
500 SYRINGE INTRAVENOUS PRN
OUTPATIENT
Start: 2023-12-28

## 2023-12-14 RX ORDER — SODIUM CHLORIDE 9 MG/ML
INJECTION, SOLUTION INTRAVENOUS CONTINUOUS
OUTPATIENT
Start: 2023-12-28

## 2023-12-14 RX ORDER — DIPHENHYDRAMINE HYDROCHLORIDE 50 MG/ML
50 INJECTION INTRAMUSCULAR; INTRAVENOUS
OUTPATIENT
Start: 2023-12-28

## 2023-12-14 RX ORDER — ACETAMINOPHEN 325 MG/1
650 TABLET ORAL
OUTPATIENT
Start: 2023-12-28

## 2023-12-14 RX ORDER — ALBUTEROL SULFATE 90 UG/1
4 AEROSOL, METERED RESPIRATORY (INHALATION) PRN
OUTPATIENT
Start: 2023-12-28

## 2023-12-14 RX ORDER — SODIUM CHLORIDE 0.9 % (FLUSH) 0.9 %
5-40 SYRINGE (ML) INJECTION PRN
OUTPATIENT
Start: 2023-12-28

## 2024-01-11 ENCOUNTER — HOSPITAL ENCOUNTER (OUTPATIENT)
Facility: HOSPITAL | Age: 54
Setting detail: INFUSION SERIES
Discharge: HOME | End: 2024-01-11
Payer: MEDICAID

## 2024-01-11 VITALS
DIASTOLIC BLOOD PRESSURE: 74 MMHG | HEART RATE: 67 BPM | SYSTOLIC BLOOD PRESSURE: 118 MMHG | OXYGEN SATURATION: 97 % | RESPIRATION RATE: 18 BRPM | TEMPERATURE: 98.1 F

## 2024-01-11 DIAGNOSIS — D50.9 IRON DEFICIENCY ANEMIA, UNSPECIFIED IRON DEFICIENCY ANEMIA TYPE: Primary | ICD-10-CM

## 2024-01-11 PROCEDURE — 6360000002 HC RX W HCPCS: Performed by: NURSE PRACTITIONER

## 2024-01-11 PROCEDURE — 96374 THER/PROPH/DIAG INJ IV PUSH: CPT

## 2024-01-11 RX ORDER — EPINEPHRINE 1 MG/ML
0.3 INJECTION, SOLUTION INTRAMUSCULAR; SUBCUTANEOUS PRN
OUTPATIENT
Start: 2024-01-18

## 2024-01-11 RX ORDER — SODIUM CHLORIDE 9 MG/ML
INJECTION, SOLUTION INTRAVENOUS CONTINUOUS
OUTPATIENT
Start: 2024-01-18

## 2024-01-11 RX ORDER — ACETAMINOPHEN 325 MG/1
650 TABLET ORAL
OUTPATIENT
Start: 2024-01-18

## 2024-01-11 RX ORDER — HEPARIN 100 UNIT/ML
500 SYRINGE INTRAVENOUS PRN
OUTPATIENT
Start: 2024-01-18

## 2024-01-11 RX ORDER — SODIUM CHLORIDE 9 MG/ML
5-250 INJECTION, SOLUTION INTRAVENOUS PRN
OUTPATIENT
Start: 2024-01-18

## 2024-01-11 RX ORDER — SODIUM CHLORIDE 9 MG/ML
5-250 INJECTION, SOLUTION INTRAVENOUS PRN
Status: DISCONTINUED | OUTPATIENT
Start: 2024-01-11 | End: 2024-01-12 | Stop reason: HOSPADM

## 2024-01-11 RX ORDER — ONDANSETRON 2 MG/ML
8 INJECTION INTRAMUSCULAR; INTRAVENOUS
OUTPATIENT
Start: 2024-01-18

## 2024-01-11 RX ORDER — ALBUTEROL SULFATE 90 UG/1
4 AEROSOL, METERED RESPIRATORY (INHALATION) PRN
OUTPATIENT
Start: 2024-01-18

## 2024-01-11 RX ORDER — DIPHENHYDRAMINE HYDROCHLORIDE 50 MG/ML
50 INJECTION INTRAMUSCULAR; INTRAVENOUS
OUTPATIENT
Start: 2024-01-18

## 2024-01-11 RX ORDER — SODIUM CHLORIDE 0.9 % (FLUSH) 0.9 %
5-40 SYRINGE (ML) INJECTION PRN
Status: CANCELLED | OUTPATIENT
Start: 2024-01-18

## 2024-01-11 RX ADMIN — IRON SUCROSE 200 MG: 20 INJECTION, SOLUTION INTRAVENOUS at 15:29

## 2024-01-11 NOTE — PROGRESS NOTES
[facility] Visit Note    1520 Pt arrived at [facility] ambulatory and in no distress for Venofer 1/5.  Assessment completed, no new complaints voiced.  IV established in left AC. Pt reports always having high BP when first arriving at MD office.    Patient denied having any symptoms of COVID-19, i.e. SOB, coughing, fever, or generally not feeling well.      Patient Vitals for the past 24 hrs:   BP Temp src Pulse Resp SpO2   01/11/24 1600 118/74 -- 67 -- --   01/11/24 1520 (!) 151/90 Temporal 69 18 97 %        Medications received:  Medications Administered         iron sucrose (VENOFER) injection 200 mg Admin Date  01/11/2024 Action  Given Dose  200 mg Route  IntraVENous Administered By  Maria Antonia Lynch, RN             1605 Tolerated treatment well, no adverse reaction noted.  D/C instructions given. IV d/c'd. D/Cd from [facility] ambulatory and in no distress accompanied by self.  Next appt 1/18

## 2024-01-15 ENCOUNTER — OFFICE VISIT (OUTPATIENT)
Age: 54
End: 2024-01-15
Payer: MEDICAID

## 2024-01-15 VITALS
WEIGHT: 146 LBS | SYSTOLIC BLOOD PRESSURE: 123 MMHG | DIASTOLIC BLOOD PRESSURE: 83 MMHG | BODY MASS INDEX: 27.59 KG/M2 | RESPIRATION RATE: 16 BRPM | TEMPERATURE: 97.1 F | HEART RATE: 73 BPM | OXYGEN SATURATION: 96 %

## 2024-01-15 DIAGNOSIS — G47.33 OSA (OBSTRUCTIVE SLEEP APNEA): Primary | ICD-10-CM

## 2024-01-15 DIAGNOSIS — R53.82 CHRONIC FATIGUE: ICD-10-CM

## 2024-01-15 PROCEDURE — 99213 OFFICE O/P EST LOW 20 MIN: CPT | Performed by: FAMILY MEDICINE

## 2024-01-15 RX ORDER — ALPRAZOLAM 0.5 MG/1
0.5 TABLET, EXTENDED RELEASE ORAL DAILY
COMMUNITY
Start: 2024-01-05

## 2024-01-15 ASSESSMENT — PATIENT HEALTH QUESTIONNAIRE - PHQ9
SUM OF ALL RESPONSES TO PHQ9 QUESTIONS 1 & 2: 0
SUM OF ALL RESPONSES TO PHQ QUESTIONS 1-9: 0
2. FEELING DOWN, DEPRESSED OR HOPELESS: 0
1. LITTLE INTEREST OR PLEASURE IN DOING THINGS: 0
SUM OF ALL RESPONSES TO PHQ QUESTIONS 1-9: 0

## 2024-01-15 NOTE — PROGRESS NOTES
Humera Harris (:  1970) is a 53 y.o. female,Established patient, here for evaluation of the following chief complaint(s):  Chronic fatigue    Due to iron def getting iron infuison helping , but still feeling tired,   Ferritin  8 - 252 NG/ML 5 Low    No menses, not snoring awakens few times at  nights, feelig like to go void,      Hemoglobin A1C  4.0 - 5.6 % 5.7 High      SULLY with AHI 8.6 currently on no cpap    Constitutional: no chills and fever,  , nad     HENT: no ear pain or nosebleeds. No blurred vision  Respiratory: no shortness of breath, wheezing cough   Cardiovascular: Has no chest pain, ,and racing heart .   Gastrointestinal: No constipation, diarrhea, nausea and vomiting.   Genitourinary: No frequency.   Musculoskeletal: Negative for joint pain.   Skin: no itching, no rash.   Neurological: Negative for dizziness, no tremors  Psychiatric/Behavioral: Negative for depression, is not nervous/anxious.        General:  alert cooperative appears stated for the age  HEENT; normocephalic and atraumatic PERRLA extraocular motor intact normal tympanic membrane normal external ear bilaterally, mucosal normal no drainage  Neck: supple no adenopathy no JVD no bruit  Lungs: Clear to auscultation bilaterally no rales rhonchi or wheezes  Heart: Normal regular S1-S2 ,  no murmur  Breast exam deferred  Abdomen: Soft nontender normal bowel sounds   Extremities: Normal range of motion no point tenderness normal pulses no edema  Pelvic/: No lesion, no lymphadenopathy  Skin: Normal no lesion no rash      ASSESSMENT/PLAN:  1. SULLY (obstructive sleep apnea)  -     [facility] Olga Haley MD, Sleep Medicine, [place]  2. Chronic fatigue  -     Olga Goldberg MD, Sleep Medicine, [place]  Pt was noted that she most likely benefit from the iron infusion and the CPAP machine to lessens the chronic fatigue fu if not better,     Return in about 3 months (around 4/15/2024), or if symptoms worsen or fail to

## 2024-01-15 NOTE — PROGRESS NOTES
Chief Complaint   Patient presents with    Dizziness       \"Have you been to the ER, urgent care clinic since your last visit?  Hospitalized since your last visit?\"    NO    “Have you seen or consulted any other health care providers [facility] since [facility] visit?”    NO        “Have you had a pap smear?”    YES         Vitals:    01/15/24 0947 01/15/24 0949   BP: (!) 154/91 123/83   Site: Left Upper Arm Right Upper Arm   Position: Sitting Sitting   Cuff Size: Large Adult Large Adult   Pulse: 73    Resp: 16    Temp: 97.1 °F (36.2 °C)    TempSrc: Temporal    SpO2: 96%    Weight: 66.2 kg (146 lb)         Health Maintenance Due   Topic Date Due    Hepatitis C screen  Never done    Cervical cancer screen  Never done        The patient, Humera Harris, identity was verified by name and

## 2024-01-18 ENCOUNTER — HOSPITAL ENCOUNTER (OUTPATIENT)
Facility: HOSPITAL | Age: 54
Setting detail: INFUSION SERIES
Discharge: HOME | End: 2024-01-18
Payer: MEDICAID

## 2024-01-18 VITALS
SYSTOLIC BLOOD PRESSURE: 130 MMHG | OXYGEN SATURATION: 98 % | TEMPERATURE: 97.7 F | DIASTOLIC BLOOD PRESSURE: 84 MMHG | HEART RATE: 65 BPM

## 2024-01-18 DIAGNOSIS — D50.9 IRON DEFICIENCY ANEMIA, UNSPECIFIED IRON DEFICIENCY ANEMIA TYPE: Primary | ICD-10-CM

## 2024-01-18 PROCEDURE — 6360000002 HC RX W HCPCS: Performed by: NURSE PRACTITIONER

## 2024-01-18 PROCEDURE — 96374 THER/PROPH/DIAG INJ IV PUSH: CPT

## 2024-01-18 PROCEDURE — 2580000003 HC RX 258: Performed by: NURSE PRACTITIONER

## 2024-01-18 RX ORDER — SODIUM CHLORIDE 0.9 % (FLUSH) 0.9 %
5-40 SYRINGE (ML) INJECTION PRN
Status: CANCELLED | OUTPATIENT
Start: 2024-01-25

## 2024-01-18 RX ORDER — EPINEPHRINE 1 MG/ML
0.3 INJECTION, SOLUTION INTRAMUSCULAR; SUBCUTANEOUS PRN
OUTPATIENT
Start: 2024-01-25

## 2024-01-18 RX ORDER — SODIUM CHLORIDE 0.9 % (FLUSH) 0.9 %
5-40 SYRINGE (ML) INJECTION PRN
Status: DISCONTINUED | OUTPATIENT
Start: 2024-01-18 | End: 2024-01-19 | Stop reason: HOSPADM

## 2024-01-18 RX ORDER — HEPARIN 100 UNIT/ML
500 SYRINGE INTRAVENOUS PRN
Status: CANCELLED | OUTPATIENT
Start: 2024-01-25

## 2024-01-18 RX ORDER — ONDANSETRON 2 MG/ML
8 INJECTION INTRAMUSCULAR; INTRAVENOUS
OUTPATIENT
Start: 2024-01-25

## 2024-01-18 RX ORDER — SODIUM CHLORIDE 9 MG/ML
5-250 INJECTION, SOLUTION INTRAVENOUS PRN
Status: CANCELLED | OUTPATIENT
Start: 2024-01-25

## 2024-01-18 RX ORDER — DIPHENHYDRAMINE HYDROCHLORIDE 50 MG/ML
50 INJECTION INTRAMUSCULAR; INTRAVENOUS
OUTPATIENT
Start: 2024-01-25

## 2024-01-18 RX ORDER — ACETAMINOPHEN 325 MG/1
650 TABLET ORAL
OUTPATIENT
Start: 2024-01-25

## 2024-01-18 RX ORDER — SODIUM CHLORIDE 9 MG/ML
INJECTION, SOLUTION INTRAVENOUS CONTINUOUS
OUTPATIENT
Start: 2024-01-25

## 2024-01-18 RX ORDER — ALBUTEROL SULFATE 90 UG/1
4 AEROSOL, METERED RESPIRATORY (INHALATION) PRN
OUTPATIENT
Start: 2024-01-25

## 2024-01-18 RX ADMIN — IRON SUCROSE 200 MG: 20 INJECTION, SOLUTION INTRAVENOUS at 15:16

## 2024-01-18 RX ADMIN — SODIUM CHLORIDE, PRESERVATIVE FREE 40 ML: 5 INJECTION INTRAVENOUS at 15:55

## 2024-01-18 NOTE — PROGRESS NOTES
[facility] Progress Note    Pt admit to [facility] for Venofer 2/5 ambulatory in stable condition. Assessment completed. States she experienced nausea the evening of her 1st iron dose last week. #22 PIV established L AC w/o issue with +br.      Patient Vitals for the past 24 hrs:   BP Temp Pulse SpO2   01/18/24 1555 130/84 -- 65 98 %   01/18/24 1511 132/85 97.7 °F (36.5 °C) 70 97 %        Medications Administered         iron sucrose (VENOFER) injection 200 mg Admin Date  01/18/2024 Action  Given Dose  200 mg Route  IntraVENous Administered By  Dana Mcknight, RN        sodium chloride flush 0.9 % injection 5-40 mL Admin Date  01/18/2024 Action  Given Dose  40 mL Route  IntraVENous Administered By  Dana Mcknight, RN           Pt tolerated treatment well. IV maintained positive blood return throughout treatment, flushed with positive blood return at conclusion and dcd. D/c home ambulatory in no distress. Pt aware of next appointment scheduled for 1/25/24 at 1500.     Future Appointments   Date Time Provider Department Center   1/25/2024  3:00 PM GAGANDEEP FASTRACK 4 [facility]   2/1/2024  3:00 PM DONIS FASTRACK 4 [facility]   2/8/2024  3:00 PM DONIS FASTRACK 7 [facility]   3/14/2024 10:30 AM Mayi Cisneros MD ON[facility] BS [facility]

## 2024-01-25 ENCOUNTER — HOSPITAL ENCOUNTER (OUTPATIENT)
Facility: HOSPITAL | Age: 54
Setting detail: INFUSION SERIES
Discharge: HOME | End: 2024-01-25
Payer: MEDICAID

## 2024-01-25 VITALS
HEART RATE: 64 BPM | RESPIRATION RATE: 18 BRPM | TEMPERATURE: 97.9 F | SYSTOLIC BLOOD PRESSURE: 130 MMHG | DIASTOLIC BLOOD PRESSURE: 89 MMHG

## 2024-01-25 DIAGNOSIS — D50.9 IRON DEFICIENCY ANEMIA, UNSPECIFIED IRON DEFICIENCY ANEMIA TYPE: Primary | ICD-10-CM

## 2024-01-25 PROCEDURE — 6360000002 HC RX W HCPCS: Performed by: NURSE PRACTITIONER

## 2024-01-25 PROCEDURE — 96374 THER/PROPH/DIAG INJ IV PUSH: CPT

## 2024-01-25 RX ORDER — DIPHENHYDRAMINE HYDROCHLORIDE 50 MG/ML
50 INJECTION INTRAMUSCULAR; INTRAVENOUS
OUTPATIENT
Start: 2024-02-01

## 2024-01-25 RX ORDER — SODIUM CHLORIDE 9 MG/ML
5-250 INJECTION, SOLUTION INTRAVENOUS PRN
Status: DISCONTINUED | OUTPATIENT
Start: 2024-01-25 | End: 2024-01-26 | Stop reason: HOSPADM

## 2024-01-25 RX ORDER — SODIUM CHLORIDE 9 MG/ML
5-250 INJECTION, SOLUTION INTRAVENOUS PRN
OUTPATIENT
Start: 2024-02-01

## 2024-01-25 RX ORDER — HEPARIN 100 UNIT/ML
500 SYRINGE INTRAVENOUS PRN
OUTPATIENT
Start: 2024-02-01

## 2024-01-25 RX ORDER — SODIUM CHLORIDE 9 MG/ML
5-250 INJECTION, SOLUTION INTRAVENOUS PRN
Status: CANCELLED | OUTPATIENT
Start: 2024-02-01

## 2024-01-25 RX ORDER — SODIUM CHLORIDE 0.9 % (FLUSH) 0.9 %
5-40 SYRINGE (ML) INJECTION PRN
Status: DISCONTINUED | OUTPATIENT
Start: 2024-01-25 | End: 2024-01-26 | Stop reason: HOSPADM

## 2024-01-25 RX ORDER — SODIUM CHLORIDE 0.9 % (FLUSH) 0.9 %
5-40 SYRINGE (ML) INJECTION PRN
OUTPATIENT
Start: 2024-02-01

## 2024-01-25 RX ORDER — ONDANSETRON 2 MG/ML
8 INJECTION INTRAMUSCULAR; INTRAVENOUS
OUTPATIENT
Start: 2024-02-01

## 2024-01-25 RX ORDER — ALBUTEROL SULFATE 90 UG/1
4 AEROSOL, METERED RESPIRATORY (INHALATION) PRN
OUTPATIENT
Start: 2024-02-01

## 2024-01-25 RX ORDER — ACETAMINOPHEN 325 MG/1
650 TABLET ORAL
OUTPATIENT
Start: 2024-02-01

## 2024-01-25 RX ORDER — EPINEPHRINE 1 MG/ML
0.3 INJECTION, SOLUTION INTRAMUSCULAR; SUBCUTANEOUS PRN
OUTPATIENT
Start: 2024-02-01

## 2024-01-25 RX ORDER — HEPARIN 100 UNIT/ML
500 SYRINGE INTRAVENOUS PRN
Status: DISCONTINUED | OUTPATIENT
Start: 2024-01-25 | End: 2024-01-26 | Stop reason: HOSPADM

## 2024-01-25 RX ORDER — SODIUM CHLORIDE 9 MG/ML
INJECTION, SOLUTION INTRAVENOUS CONTINUOUS
OUTPATIENT
Start: 2024-02-01

## 2024-01-25 RX ADMIN — IRON SUCROSE 200 MG: 20 INJECTION, SOLUTION INTRAVENOUS at 15:19

## 2024-01-25 ASSESSMENT — PAIN SCALES - GENERAL: PAINLEVEL_OUTOF10: 0

## 2024-01-25 NOTE — PROGRESS NOTES
[facility] Peds/Adult Note                       Date: 2024    Name: Humera Harris    MRN: [id number]         : 1970    1500 Patient arrives for Venofer 3/5 without acute problems. Please see [facility] for complete assessment and education provided.    Vital signs stable throughout and prior to discharge. Patient tolerated procedure well and was discharged without incident. Patient is aware of next [facility] appointment on 2024. Appointment card give to the Patient.      Ms. Harris's vitals were reviewed prior to and after treatment.   Patient Vitals for the past 24 hrs:   BP Temp Temp src Pulse Resp   24 1550 130/89 -- -- 64 18   24 1500 115/75 97.9 °F (36.6 °C) Temporal 72 18       Medications given:   Medications Administered         iron sucrose (VENOFER) injection 200 mg Admin Date  2024 Action  Given Dose  200 mg Route  IntraVENous Administered By  Adriana Estrada RN            Ms. Harris tolerated the infusion, and had no complaints.    Ms. Harris was discharged from [facility] in stable condition. Discharge Instructions provided to patient, patient verbalized understanding but denied the request for a copy of d/c instructions.     Future Appointments   Date Time Provider Department Center   2024  3:00 PM GAGANDEEP CHENG 4 [facility]   2024  3:00 PM GAGANDEEP CHENG 7 [facility]   3/14/2024 10:30 AM Mayi Cisneros MD [facility] BS [facility]       ADRIANA ESTRADA RN  2024  3:39 PM

## 2024-01-30 ENCOUNTER — HOSPITAL ENCOUNTER (EMERGENCY)
Facility: HOSPITAL | Age: 54
Discharge: HOME | End: 2024-01-30
Attending: STUDENT IN AN ORGANIZED HEALTH CARE EDUCATION/TRAINING PROGRAM
Payer: MEDICAID

## 2024-01-30 VITALS
SYSTOLIC BLOOD PRESSURE: 143 MMHG | RESPIRATION RATE: 15 BRPM | DIASTOLIC BLOOD PRESSURE: 86 MMHG | OXYGEN SATURATION: 99 % | TEMPERATURE: 97.4 F | HEART RATE: 70 BPM

## 2024-01-30 DIAGNOSIS — R42 LIGHTHEADEDNESS: ICD-10-CM

## 2024-01-30 DIAGNOSIS — R11.2 NAUSEA AND VOMITING, UNSPECIFIED VOMITING TYPE: Primary | ICD-10-CM

## 2024-01-30 DIAGNOSIS — R53.1 GENERALIZED WEAKNESS: ICD-10-CM

## 2024-01-30 LAB
ALBUMIN SERPL-MCNC: 3.5 G/DL (ref 3.5–5)
ALBUMIN/GLOB SERPL: 0.8 (ref 1.1–2.2)
ALP SERPL-CCNC: 73 U/L (ref 45–117)
ALT SERPL-CCNC: 45 U/L (ref 12–78)
ANION GAP SERPL CALC-SCNC: 10 MMOL/L (ref 5–15)
APPEARANCE UR: CLEAR
AST SERPL-CCNC: 22 U/L (ref 15–37)
BASOPHILS # BLD: 0.1 K/UL (ref 0–0.1)
BASOPHILS NFR BLD: 1 % (ref 0–1)
BILIRUB SERPL-MCNC: 0.3 MG/DL (ref 0.2–1)
BILIRUB UR QL: NEGATIVE
BUN SERPL-MCNC: 11 MG/DL (ref 6–20)
BUN/CREAT SERPL: 9 (ref 12–20)
CALCIUM SERPL-MCNC: 9.8 MG/DL (ref 8.5–10.1)
CHLORIDE SERPL-SCNC: 108 MMOL/L (ref 97–108)
CO2 SERPL-SCNC: 20 MMOL/L (ref 21–32)
COLOR UR: ABNORMAL
CREAT SERPL-MCNC: 1.18 MG/DL (ref 0.55–1.02)
DEPRECATED S PYO AG THROAT QL EIA: NEGATIVE
DIFFERENTIAL METHOD BLD: ABNORMAL
EKG ATRIAL RATE: 70 BPM
EKG DIAGNOSIS: NORMAL
EKG P AXIS: 72 DEGREES
EKG P-R INTERVAL: 124 MS
EKG Q-T INTERVAL: 424 MS
EKG QRS DURATION: 82 MS
EKG QTC CALCULATION (BAZETT): 457 MS
EKG R AXIS: 51 DEGREES
EKG T AXIS: 72 DEGREES
EKG VENTRICULAR RATE: 70 BPM
EOSINOPHIL # BLD: 0.4 K/UL (ref 0–0.4)
EOSINOPHIL NFR BLD: 5 % (ref 0–7)
ERYTHROCYTE [DISTWIDTH] IN BLOOD BY AUTOMATED COUNT: 23.3 % (ref 11.5–14.5)
FLUAV AG NPH QL IA: NEGATIVE
FLUBV AG NOSE QL IA: NEGATIVE
GLOBULIN SER CALC-MCNC: 4.4 G/DL (ref 2–4)
GLUCOSE SERPL-MCNC: 154 MG/DL (ref 65–100)
GLUCOSE UR STRIP.AUTO-MCNC: NEGATIVE MG/DL
HCT VFR BLD AUTO: 41.6 % (ref 35–47)
HGB BLD-MCNC: 13.1 G/DL (ref 11.5–16)
HGB UR QL STRIP: NEGATIVE
IMM GRANULOCYTES # BLD AUTO: 0 K/UL (ref 0–0.04)
IMM GRANULOCYTES NFR BLD AUTO: 0 % (ref 0–0.5)
KETONES UR QL STRIP.AUTO: ABNORMAL MG/DL
LEUKOCYTE ESTERASE UR QL STRIP.AUTO: NEGATIVE
LIPASE SERPL-CCNC: 38 U/L (ref 13–75)
LYMPHOCYTES # BLD: 0.8 K/UL (ref 0.8–3.5)
LYMPHOCYTES NFR BLD: 11 % (ref 12–49)
MAGNESIUM SERPL-MCNC: 1.5 MG/DL (ref 1.6–2.4)
MCH RBC QN AUTO: 25 PG (ref 26–34)
MCHC RBC AUTO-ENTMCNC: 31.5 G/DL (ref 30–36.5)
MCV RBC AUTO: 79.2 FL (ref 80–99)
MONOCYTES # BLD: 0.5 K/UL (ref 0–1)
MONOCYTES NFR BLD: 7 % (ref 5–13)
NEUTS SEG # BLD: 5.6 K/UL (ref 1.8–8)
NEUTS SEG NFR BLD: 76 % (ref 32–75)
NITRITE UR QL STRIP.AUTO: NEGATIVE
NRBC # BLD: 0 K/UL (ref 0–0.01)
NRBC BLD-RTO: 0 PER 100 WBC
PH UR STRIP: 8 (ref 5–8)
PLATELET # BLD AUTO: 235 K/UL (ref 150–400)
PMV BLD AUTO: 9.5 FL (ref 8.9–12.9)
POTASSIUM SERPL-SCNC: 3.9 MMOL/L (ref 3.5–5.1)
PROT SERPL-MCNC: 7.9 G/DL (ref 6.4–8.2)
PROT UR STRIP-MCNC: NEGATIVE MG/DL
RBC # BLD AUTO: 5.25 M/UL (ref 3.8–5.2)
RBC MORPH BLD: ABNORMAL
SARS-COV-2 RDRP RESP QL NAA+PROBE: NOT DETECTED
SODIUM SERPL-SCNC: 138 MMOL/L (ref 136–145)
SOURCE: NORMAL
SP GR UR REFRACTOMETRY: 1.01
UROBILINOGEN UR QL STRIP.AUTO: 0.2 EU/DL (ref 0.2–1)
WBC # BLD AUTO: 7.4 K/UL (ref 3.6–11)

## 2024-01-30 PROCEDURE — 6370000000 HC RX 637 (ALT 250 FOR IP): Performed by: STUDENT IN AN ORGANIZED HEALTH CARE EDUCATION/TRAINING PROGRAM

## 2024-01-30 PROCEDURE — 94761 N-INVAS EAR/PLS OXIMETRY MLT: CPT

## 2024-01-30 PROCEDURE — 83735 ASSAY OF MAGNESIUM: CPT

## 2024-01-30 PROCEDURE — 99284 EMERGENCY DEPT VISIT MOD MDM: CPT

## 2024-01-30 PROCEDURE — 87070 CULTURE OTHR SPECIMN AEROBIC: CPT

## 2024-01-30 PROCEDURE — 87635 SARS-COV-2 COVID-19 AMP PRB: CPT

## 2024-01-30 PROCEDURE — 87880 STREP A ASSAY W/OPTIC: CPT

## 2024-01-30 PROCEDURE — 85025 COMPLETE CBC W/AUTO DIFF WBC: CPT

## 2024-01-30 PROCEDURE — 83690 ASSAY OF LIPASE: CPT

## 2024-01-30 PROCEDURE — 36415 COLL VENOUS BLD VENIPUNCTURE: CPT

## 2024-01-30 PROCEDURE — 6360000002 HC RX W HCPCS: Performed by: STUDENT IN AN ORGANIZED HEALTH CARE EDUCATION/TRAINING PROGRAM

## 2024-01-30 PROCEDURE — 2580000003 HC RX 258: Performed by: STUDENT IN AN ORGANIZED HEALTH CARE EDUCATION/TRAINING PROGRAM

## 2024-01-30 PROCEDURE — 87804 INFLUENZA ASSAY W/OPTIC: CPT

## 2024-01-30 PROCEDURE — 80053 COMPREHEN METABOLIC PANEL: CPT

## 2024-01-30 PROCEDURE — 81002 URINALYSIS NONAUTO W/O SCOPE: CPT

## 2024-01-30 RX ORDER — MECLIZINE HCL 12.5 MG/1
25 TABLET ORAL ONCE
Status: COMPLETED | OUTPATIENT
Start: 2024-01-30 | End: 2024-01-30

## 2024-01-30 RX ORDER — 0.9 % SODIUM CHLORIDE 0.9 %
1000 INTRAVENOUS SOLUTION INTRAVENOUS ONCE
Status: COMPLETED | OUTPATIENT
Start: 2024-01-30 | End: 2024-01-30

## 2024-01-30 RX ORDER — FAMOTIDINE 20 MG/1
20 TABLET, FILM COATED ORAL DAILY
Qty: 20 TABLET | Refills: 0 | Status: SHIPPED | OUTPATIENT
Start: 2024-01-30

## 2024-01-30 RX ORDER — BUTALBITAL, ACETAMINOPHEN AND CAFFEINE 50; 325; 40 MG/1; MG/1; MG/1
1 TABLET ORAL
Status: DISCONTINUED | OUTPATIENT
Start: 2024-01-30 | End: 2024-01-30 | Stop reason: HOSPADM

## 2024-01-30 RX ORDER — ONDANSETRON 4 MG/1
4 TABLET, FILM COATED ORAL 3 TIMES DAILY PRN
Qty: 30 TABLET | Refills: 0 | Status: SHIPPED | OUTPATIENT
Start: 2024-01-30

## 2024-01-30 RX ORDER — ONDANSETRON 2 MG/ML
4 INJECTION INTRAMUSCULAR; INTRAVENOUS ONCE
Status: COMPLETED | OUTPATIENT
Start: 2024-01-30 | End: 2024-01-30

## 2024-01-30 RX ORDER — ACETAMINOPHEN 500 MG
1000 TABLET ORAL
Status: COMPLETED | OUTPATIENT
Start: 2024-01-30 | End: 2024-01-30

## 2024-01-30 RX ORDER — METOCLOPRAMIDE HYDROCHLORIDE 5 MG/ML
10 INJECTION INTRAMUSCULAR; INTRAVENOUS ONCE
Status: DISCONTINUED | OUTPATIENT
Start: 2024-01-30 | End: 2024-01-30 | Stop reason: HOSPADM

## 2024-01-30 RX ADMIN — ONDANSETRON 4 MG: 2 INJECTION INTRAMUSCULAR; INTRAVENOUS at 11:33

## 2024-01-30 RX ADMIN — SODIUM CHLORIDE 1000 ML: 9 INJECTION, SOLUTION INTRAVENOUS at 11:34

## 2024-01-30 RX ADMIN — MECLIZINE 25 MG: 12.5 TABLET ORAL at 11:34

## 2024-01-30 RX ADMIN — ACETAMINOPHEN 1000 MG: 500 TABLET ORAL at 15:43

## 2024-01-30 NOTE — ED PROVIDER NOTES
EMERGENCY DEPARTMENT HISTORY AND PHYSICAL EXAM      Date: 1/30/2024  Patient Name: Humera Harris    History of Presenting Illness     Chief Complaint   Patient presents with    Emesis     Pt presents to ER from home via EMS w/ complaints of N/V and SOB that started last night. EMS reports pt's family have had the flu this week. Pt is actively vomiting clear liquid at this time          HPI: History From: patient, History limited by: none  Humera Harris, 53 y.o. female presents to the ED with cc of dizziness and nausea and vomiting.  The dizziness started yesterday, she describes as a sensation of lightheadedness more when she sits up and turns.  She reports a history of vertigo.  Then today she began to have nausea and vomited countless episodes of emesis.  She now feels very weak all over like she has no energy.  States that her son was recently sick.  She denies abdominal pain, no fevers, reports chills.  No weakness or numbness on one side of her body, no vision changes.  She does not take any daily medications, she denies tobacco alcohol or illicit drug use.  Denies past medical history other than vertigo.          There are no other complaints, changes, or physical findings at this time.    PCP: Dennis Grove MD    No current facility-administered medications on file prior to encounter.     Current Outpatient Medications on File Prior to Encounter   Medication Sig Dispense Refill    ALPRAZolam (XANAX XR) 0.5 MG extended release tablet Take 1 tablet by mouth daily. Max Daily Amount: 0.5 mg (Patient not taking: Reported on 1/15/2024)      vitamin D (ERGOCALCIFEROL) 1.25 MG (49194 UT) CAPS capsule TAKE 1 CAPSULE BY MOUTH ONE TIME PER WEEK 4 capsule 11    ferrous sulfate (IRON 325) 325 (65 Fe) MG tablet Take 1 tablet by mouth 3 times daily (with meals) 90 tablet 1    lamoTRIgine (LAMICTAL) 25 MG tablet Take 0.5 tablets by mouth at bedtime 30 tablet 3    QUEtiapine (SEROQUEL) 25 MG tablet Take 1 tablet by mouth

## 2024-02-01 ENCOUNTER — HOSPITAL ENCOUNTER (OUTPATIENT)
Facility: HOSPITAL | Age: 54
Setting detail: INFUSION SERIES
Discharge: HOME | End: 2024-02-01
Payer: MEDICAID

## 2024-02-01 VITALS
DIASTOLIC BLOOD PRESSURE: 87 MMHG | RESPIRATION RATE: 16 BRPM | HEART RATE: 86 BPM | TEMPERATURE: 98.3 F | OXYGEN SATURATION: 96 % | SYSTOLIC BLOOD PRESSURE: 144 MMHG

## 2024-02-01 DIAGNOSIS — D50.9 IRON DEFICIENCY ANEMIA, UNSPECIFIED IRON DEFICIENCY ANEMIA TYPE: Primary | ICD-10-CM

## 2024-02-01 LAB
BACTERIA SPEC CULT: NORMAL
SERVICE CMNT-IMP: NORMAL

## 2024-02-01 PROCEDURE — 96374 THER/PROPH/DIAG INJ IV PUSH: CPT

## 2024-02-01 PROCEDURE — 6360000002 HC RX W HCPCS: Performed by: NURSE PRACTITIONER

## 2024-02-01 RX ORDER — DIPHENHYDRAMINE HYDROCHLORIDE 50 MG/ML
50 INJECTION INTRAMUSCULAR; INTRAVENOUS
OUTPATIENT
Start: 2024-02-08

## 2024-02-01 RX ORDER — HEPARIN 100 UNIT/ML
500 SYRINGE INTRAVENOUS PRN
OUTPATIENT
Start: 2024-02-08

## 2024-02-01 RX ORDER — SODIUM CHLORIDE 9 MG/ML
5-250 INJECTION, SOLUTION INTRAVENOUS PRN
OUTPATIENT
Start: 2024-02-08

## 2024-02-01 RX ORDER — ALBUTEROL SULFATE 90 UG/1
4 AEROSOL, METERED RESPIRATORY (INHALATION) PRN
OUTPATIENT
Start: 2024-02-08

## 2024-02-01 RX ORDER — SODIUM CHLORIDE 9 MG/ML
5-250 INJECTION, SOLUTION INTRAVENOUS PRN
Status: DISCONTINUED | OUTPATIENT
Start: 2024-02-01 | End: 2024-02-02 | Stop reason: HOSPADM

## 2024-02-01 RX ORDER — ACETAMINOPHEN 325 MG/1
650 TABLET ORAL
OUTPATIENT
Start: 2024-02-08

## 2024-02-01 RX ORDER — SODIUM CHLORIDE 0.9 % (FLUSH) 0.9 %
5-40 SYRINGE (ML) INJECTION PRN
OUTPATIENT
Start: 2024-02-08

## 2024-02-01 RX ORDER — SODIUM CHLORIDE 9 MG/ML
INJECTION, SOLUTION INTRAVENOUS CONTINUOUS
OUTPATIENT
Start: 2024-02-08

## 2024-02-01 RX ORDER — ONDANSETRON 2 MG/ML
8 INJECTION INTRAMUSCULAR; INTRAVENOUS
OUTPATIENT
Start: 2024-02-08

## 2024-02-01 RX ORDER — EPINEPHRINE 1 MG/ML
0.3 INJECTION, SOLUTION INTRAMUSCULAR; SUBCUTANEOUS PRN
OUTPATIENT
Start: 2024-02-08

## 2024-02-01 RX ADMIN — IRON SUCROSE 200 MG: 20 INJECTION, SOLUTION INTRAVENOUS at 15:24

## 2024-02-01 NOTE — PROGRESS NOTES
Name: Humera Harris    MRN: [id number]         : 1970    Ms. Harris Arrived ambulatory and in no distress for Venofer Dose 4 of 5.  Assessment was completed, no acute issues at this time, no new complaints voiced.  24 G PIV established to left arm without difficulty.     Ms. Harris's vitals were reviewed.  Patient Vitals for the past 24 hrs:   BP Temp Temp src Pulse Resp SpO2   24 1556 (!) 144/87 -- -- 86 16 --   24 1517 (!) 135/90 98.3 °F (36.8 °C) Temporal 89 16 96 %       Medications:  Medications Administered         iron sucrose (VENOFER) injection 200 mg Admin Date  2024 Action  Given Dose  200 mg Route  IntraVENous Administered By  Francia Lopez, RN            Ms. Harris tolerated treatment well and was discharged [facility] in stable condition .   PIV flushed & removed.  Discharge instructions reviewed with patient, verbalized understanding.  Patient observed for 30 minutes post infusion, no s/s of reaction.  She is to return on  for her next appointment.    FRANCIA LOPEZ RN  2024

## 2024-02-08 ENCOUNTER — HOSPITAL ENCOUNTER (OUTPATIENT)
Facility: HOSPITAL | Age: 54
Setting detail: INFUSION SERIES
Discharge: HOME | End: 2024-02-08
Payer: MEDICAID

## 2024-02-08 VITALS
SYSTOLIC BLOOD PRESSURE: 145 MMHG | OXYGEN SATURATION: 100 % | TEMPERATURE: 97.8 F | DIASTOLIC BLOOD PRESSURE: 84 MMHG | RESPIRATION RATE: 16 BRPM | HEART RATE: 72 BPM

## 2024-02-08 DIAGNOSIS — D50.9 IRON DEFICIENCY ANEMIA, UNSPECIFIED IRON DEFICIENCY ANEMIA TYPE: Primary | ICD-10-CM

## 2024-02-08 PROCEDURE — 96374 THER/PROPH/DIAG INJ IV PUSH: CPT

## 2024-02-08 PROCEDURE — 6360000002 HC RX W HCPCS: Performed by: NURSE PRACTITIONER

## 2024-02-08 RX ORDER — ALBUTEROL SULFATE 90 UG/1
4 AEROSOL, METERED RESPIRATORY (INHALATION) PRN
OUTPATIENT
Start: 2024-02-08

## 2024-02-08 RX ORDER — SODIUM CHLORIDE 0.9 % (FLUSH) 0.9 %
5-40 SYRINGE (ML) INJECTION PRN
OUTPATIENT
Start: 2024-02-08

## 2024-02-08 RX ORDER — SODIUM CHLORIDE 9 MG/ML
5-250 INJECTION, SOLUTION INTRAVENOUS PRN
OUTPATIENT
Start: 2024-02-08

## 2024-02-08 RX ORDER — ACETAMINOPHEN 325 MG/1
650 TABLET ORAL
OUTPATIENT
Start: 2024-02-08

## 2024-02-08 RX ORDER — EPINEPHRINE 1 MG/ML
0.3 INJECTION, SOLUTION INTRAMUSCULAR; SUBCUTANEOUS PRN
OUTPATIENT
Start: 2024-02-08

## 2024-02-08 RX ORDER — ONDANSETRON 2 MG/ML
8 INJECTION INTRAMUSCULAR; INTRAVENOUS
OUTPATIENT
Start: 2024-02-08

## 2024-02-08 RX ORDER — DIPHENHYDRAMINE HYDROCHLORIDE 50 MG/ML
50 INJECTION INTRAMUSCULAR; INTRAVENOUS
OUTPATIENT
Start: 2024-02-08

## 2024-02-08 RX ORDER — HEPARIN 100 UNIT/ML
500 SYRINGE INTRAVENOUS PRN
OUTPATIENT
Start: 2024-02-08

## 2024-02-08 RX ORDER — SODIUM CHLORIDE 9 MG/ML
INJECTION, SOLUTION INTRAVENOUS CONTINUOUS
OUTPATIENT
Start: 2024-02-08

## 2024-02-08 RX ORDER — SODIUM CHLORIDE 9 MG/ML
5-250 INJECTION, SOLUTION INTRAVENOUS PRN
Status: DISCONTINUED | OUTPATIENT
Start: 2024-02-08 | End: 2024-02-09 | Stop reason: HOSPADM

## 2024-02-08 RX ADMIN — IRON SUCROSE 200 MG: 20 INJECTION, SOLUTION INTRAVENOUS at 15:25

## 2024-02-08 NOTE — PROGRESS NOTES
Name: Humera Harris    MRN: [id number]         : 1970    Ms. Harris Arrived ambulatory and in no distress for Venofer Dose 5 of 5. Assessment was completed, no acute issues at this time, no new complaints voiced.  24 G PIV established to left arm without difficulty.     Ms. Harris's vitals were reviewed.  Patient Vitals for the past 24 hrs:   BP Temp Temp src Pulse Resp SpO2   24 1552 (!) 145/84 -- -- 72 16 --   24 1519 115/72 97.8 °F (36.6 °C) Temporal 69 16 100 %   24 1517 -- 98.2 °F (36.8 °C) Temporal -- -- --       Medications:  Medications Administered         iron sucrose (VENOFER) injection 200 mg Admin Date  2024 Action  Given Dose  200 mg Route  IntraVENous Administered By  Francia Lopez, RN            Ms. Harris tolerated treatment well and was discharged from [facility] in stable condition .   PIV flushed & removed.  Discharge instructions reviewed with patient, verbalized understanding.  Patient observed for 30 minutes post infusion, no s/s of reaction. Pt has no further appointments; follow-up with Dr. Cisneros on 2024.    FRANCIA LOPEZ RN  2024

## 2024-03-08 ENCOUNTER — TELEPHONE (OUTPATIENT)
Age: 54
End: 2024-03-08

## 2024-03-12 ENCOUNTER — TELEPHONE (OUTPATIENT)
Age: 54
End: 2024-03-12

## 2024-03-13 DIAGNOSIS — D50.0 IRON DEFICIENCY ANEMIA DUE TO CHRONIC BLOOD LOSS: ICD-10-CM

## 2024-03-14 ENCOUNTER — OFFICE VISIT (OUTPATIENT)
Age: 54
End: 2024-03-14
Payer: MEDICAID

## 2024-03-14 VITALS
DIASTOLIC BLOOD PRESSURE: 93 MMHG | OXYGEN SATURATION: 96 % | RESPIRATION RATE: 18 BRPM | WEIGHT: 156.6 LBS | TEMPERATURE: 98 F | BODY MASS INDEX: 29.57 KG/M2 | SYSTOLIC BLOOD PRESSURE: 145 MMHG | HEART RATE: 66 BPM | HEIGHT: 61 IN

## 2024-03-14 DIAGNOSIS — D50.0 IRON DEFICIENCY ANEMIA DUE TO CHRONIC BLOOD LOSS: Primary | ICD-10-CM

## 2024-03-14 LAB
ALBUMIN SERPL-MCNC: 3.2 G/DL (ref 3.5–5)
ALBUMIN/GLOB SERPL: 0.8 (ref 1.1–2.2)
ALP SERPL-CCNC: 90 U/L (ref 45–117)
ALT SERPL-CCNC: 101 U/L (ref 12–78)
ANION GAP SERPL CALC-SCNC: 6 MMOL/L (ref 5–15)
AST SERPL-CCNC: 40 U/L (ref 15–37)
BASOPHILS # BLD: 0 K/UL (ref 0–0.1)
BASOPHILS NFR BLD: 1 % (ref 0–1)
BILIRUB SERPL-MCNC: 0.3 MG/DL (ref 0.2–1)
BUN SERPL-MCNC: 12 MG/DL (ref 6–20)
BUN/CREAT SERPL: 11 (ref 12–20)
CALCIUM SERPL-MCNC: 9.5 MG/DL (ref 8.5–10.1)
CHLORIDE SERPL-SCNC: 110 MMOL/L (ref 97–108)
CO2 SERPL-SCNC: 25 MMOL/L (ref 21–32)
CREAT SERPL-MCNC: 1.07 MG/DL (ref 0.55–1.02)
DIFFERENTIAL METHOD BLD: ABNORMAL
EOSINOPHIL # BLD: 0.1 K/UL (ref 0–0.4)
EOSINOPHIL NFR BLD: 1 % (ref 0–7)
ERYTHROCYTE [DISTWIDTH] IN BLOOD BY AUTOMATED COUNT: 18.6 % (ref 11.5–14.5)
FERRITIN SERPL-MCNC: 124 NG/ML (ref 26–388)
GLOBULIN SER CALC-MCNC: 3.8 G/DL (ref 2–4)
GLUCOSE SERPL-MCNC: 98 MG/DL (ref 65–100)
HCT VFR BLD AUTO: 42.6 % (ref 35–47)
HGB BLD-MCNC: 13.4 G/DL (ref 11.5–16)
IMM GRANULOCYTES # BLD AUTO: 0 K/UL (ref 0–0.04)
IMM GRANULOCYTES NFR BLD AUTO: 1 % (ref 0–0.5)
IRON SATN MFR SERPL: 37 % (ref 20–50)
IRON SERPL-MCNC: 95 UG/DL (ref 35–150)
LYMPHOCYTES # BLD: 1.4 K/UL (ref 0.8–3.5)
LYMPHOCYTES NFR BLD: 21 % (ref 12–49)
MCH RBC QN AUTO: 27.5 PG (ref 26–34)
MCHC RBC AUTO-ENTMCNC: 31.5 G/DL (ref 30–36.5)
MCV RBC AUTO: 87.3 FL (ref 80–99)
MONOCYTES # BLD: 0.6 K/UL (ref 0–1)
MONOCYTES NFR BLD: 8 % (ref 5–13)
NEUTS SEG # BLD: 4.5 K/UL (ref 1.8–8)
NEUTS SEG NFR BLD: 68 % (ref 32–75)
NRBC # BLD: 0 K/UL (ref 0–0.01)
NRBC BLD-RTO: 0 PER 100 WBC
PLATELET # BLD AUTO: 231 K/UL (ref 150–400)
PMV BLD AUTO: 10.2 FL (ref 8.9–12.9)
POTASSIUM SERPL-SCNC: 4 MMOL/L (ref 3.5–5.1)
PROT SERPL-MCNC: 7 G/DL (ref 6.4–8.2)
RBC # BLD AUTO: 4.88 M/UL (ref 3.8–5.2)
SODIUM SERPL-SCNC: 141 MMOL/L (ref 136–145)
TIBC SERPL-MCNC: 260 UG/DL (ref 250–450)
WBC # BLD AUTO: 6.6 K/UL (ref 3.6–11)

## 2024-03-14 PROCEDURE — 99213 OFFICE O/P EST LOW 20 MIN: CPT | Performed by: STUDENT IN AN ORGANIZED HEALTH CARE EDUCATION/TRAINING PROGRAM

## 2024-03-14 NOTE — PROGRESS NOTES
Humera Harris is a 53 y.o. female seeing provider today for GEOVANNA f/u. Pt report she has not had a menstrual cycle since Sept 2023. Pt denies cravings. No recent endoscopy or colonoscopy. Elevated B/P noted; pt denies visual disturbances, pt denies H/A; pt report her B/P is always elevated in the morning. Mild fatigue.     Chief Complaint   Patient presents with    Follow-up     GEOVANNA     1. Have you been to the ER, urgent care clinic since your last visit?  Hospitalized since your last visit?No    2. Have you seen or consulted any other health care providers outside of the [facility] since your last visit?  Include any pap smears or colon screening. No

## 2024-03-14 NOTE — PROGRESS NOTES
[facility] [place] [facility]  [address] [place] [address]  W: [phone] F: [phone]    Reason for Visit:   Humera Harris is a 53 y.o. female who is seen in consultation at the request of Dr. Grove for evaluation of microcytic anemia in the setting of iron deficiency.      Hematology / Oncology Treatment Information:     Hematological/Oncological Diagnosis: Microcytic anemia, severe    Date of Diagnosis: Chronic    Oncology/Hematology Treatment Course:   Treatment course:   1) intolerant to oral iron because of severe nausea, constipation.      Pathology and Molecular Testing:       Initial Presentation  (HPI):     Very pleasant 53-year-old female with a relevant medical history most significant for anxiety, GERD, psoriasis, presents for evaluation and treatment of chronic iron deficiency anemia, now with severe microcytic anemia.  She is intolerant to oral iron supplementation as it causes severe nausea, constipation.  This is limited her ability to tolerate oral iron in the past.    She has chronic vaginal bleeding that has been heavy in the recent past.  She is following with her GYN to try to better control of the dysfunctional uterine bleeding.    She has constitutional symptoms of severe fatigue, ice cravings, restless legs, muscle aches.  These findings are consistent with symptomatic iron deficiency anemia.    Family history reviewed, non contributory  Social history reviewed, also non contributory    Review of Systems: A complete review of systems was obtained, negative except as described above.    Interval History:     3/14/24    No new clinical worsening. Overall in good shape.      Past Medical History:   Diagnosis Date    Anxiety 4/20/2023    Contact dermatitis and other eczema, due to unspecified cause     GERD (gastroesophageal reflux disease)     Headache(784.0)     Other screening mammogram 02/26/13

## 2024-05-17 ENCOUNTER — HOSPITAL ENCOUNTER (OUTPATIENT)
Facility: HOSPITAL | Age: 54
End: 2024-05-17
Attending: FAMILY MEDICINE
Payer: MEDICAID

## 2024-05-17 VITALS — BODY MASS INDEX: 29.45 KG/M2 | HEIGHT: 61 IN | WEIGHT: 156 LBS

## 2024-05-17 DIAGNOSIS — Z12.31 VISIT FOR SCREENING MAMMOGRAM: ICD-10-CM

## 2024-05-17 PROCEDURE — 77067 SCR MAMMO BI INCL CAD: CPT

## 2024-09-09 DIAGNOSIS — D50.0 IRON DEFICIENCY ANEMIA DUE TO CHRONIC BLOOD LOSS: ICD-10-CM

## 2024-09-09 LAB
ALBUMIN SERPL-MCNC: 3.5 G/DL (ref 3.5–5)
ALBUMIN/GLOB SERPL: 0.9 (ref 1.1–2.2)
ALP SERPL-CCNC: 85 U/L (ref 45–117)
ALT SERPL-CCNC: 31 U/L (ref 12–78)
ANION GAP SERPL CALC-SCNC: 5 MMOL/L (ref 2–12)
AST SERPL-CCNC: 21 U/L (ref 15–37)
BASOPHILS # BLD: 0 K/UL (ref 0–0.1)
BASOPHILS NFR BLD: 1 % (ref 0–1)
BILIRUB SERPL-MCNC: 0.3 MG/DL (ref 0.2–1)
BUN SERPL-MCNC: 12 MG/DL (ref 6–20)
BUN/CREAT SERPL: 10 (ref 12–20)
CALCIUM SERPL-MCNC: 9.1 MG/DL (ref 8.5–10.1)
CHLORIDE SERPL-SCNC: 111 MMOL/L (ref 97–108)
CO2 SERPL-SCNC: 25 MMOL/L (ref 21–32)
CREAT SERPL-MCNC: 1.17 MG/DL (ref 0.55–1.02)
DIFFERENTIAL METHOD BLD: NORMAL
EOSINOPHIL # BLD: 0.1 K/UL (ref 0–0.4)
EOSINOPHIL NFR BLD: 1 % (ref 0–7)
ERYTHROCYTE [DISTWIDTH] IN BLOOD BY AUTOMATED COUNT: 12.5 % (ref 11.5–14.5)
FERRITIN SERPL-MCNC: 215 NG/ML (ref 8–252)
GLOBULIN SER CALC-MCNC: 3.7 G/DL (ref 2–4)
GLUCOSE SERPL-MCNC: 80 MG/DL (ref 65–100)
HCT VFR BLD AUTO: 41.5 % (ref 35–47)
HGB BLD-MCNC: 13.3 G/DL (ref 11.5–16)
IMM GRANULOCYTES # BLD AUTO: 0 K/UL (ref 0–0.04)
IMM GRANULOCYTES NFR BLD AUTO: 0 % (ref 0–0.5)
IRON SATN MFR SERPL: 33 % (ref 20–50)
IRON SERPL-MCNC: 82 UG/DL (ref 35–150)
LYMPHOCYTES # BLD: 1.6 K/UL (ref 0.8–3.5)
LYMPHOCYTES NFR BLD: 23 % (ref 12–49)
MCH RBC QN AUTO: 30.1 PG (ref 26–34)
MCHC RBC AUTO-ENTMCNC: 32 G/DL (ref 30–36.5)
MCV RBC AUTO: 93.9 FL (ref 80–99)
MONOCYTES # BLD: 0.7 K/UL (ref 0–1)
MONOCYTES NFR BLD: 10 % (ref 5–13)
NEUTS SEG # BLD: 4.5 K/UL (ref 1.8–8)
NEUTS SEG NFR BLD: 65 % (ref 32–75)
NRBC # BLD: 0 K/UL (ref 0–0.01)
NRBC BLD-RTO: 0 PER 100 WBC
PLATELET # BLD AUTO: 236 K/UL (ref 150–400)
PMV BLD AUTO: 10.2 FL (ref 8.9–12.9)
POTASSIUM SERPL-SCNC: 4.8 MMOL/L (ref 3.5–5.1)
PROT SERPL-MCNC: 7.2 G/DL (ref 6.4–8.2)
RBC # BLD AUTO: 4.42 M/UL (ref 3.8–5.2)
SODIUM SERPL-SCNC: 141 MMOL/L (ref 136–145)
TIBC SERPL-MCNC: 245 UG/DL (ref 250–450)
WBC # BLD AUTO: 6.9 K/UL (ref 3.6–11)

## 2024-09-12 ENCOUNTER — OFFICE VISIT (OUTPATIENT)
Age: 54
End: 2024-09-12
Payer: MEDICAID

## 2024-09-12 VITALS
HEART RATE: 78 BPM | DIASTOLIC BLOOD PRESSURE: 88 MMHG | RESPIRATION RATE: 16 BRPM | OXYGEN SATURATION: 98 % | HEIGHT: 61 IN | WEIGHT: 165.4 LBS | SYSTOLIC BLOOD PRESSURE: 144 MMHG | BODY MASS INDEX: 31.23 KG/M2 | TEMPERATURE: 97.7 F

## 2024-09-12 DIAGNOSIS — D50.0 IRON DEFICIENCY ANEMIA DUE TO CHRONIC BLOOD LOSS: Primary | ICD-10-CM

## 2024-09-12 PROCEDURE — 99213 OFFICE O/P EST LOW 20 MIN: CPT | Performed by: NURSE PRACTITIONER

## 2024-12-20 NOTE — TELEPHONE ENCOUNTER
Last appointment: 1/15/24  Next appointment: Advised to follow-up 4/15/24  Previous refill encounter(s): 12/21/23    Requested Prescriptions     Pending Prescriptions Disp Refills    vitamin D (ERGOCALCIFEROL) 1.25 MG (57660 UT) CAPS capsule [Pharmacy Med Name: VITAMIN D2 1.25MG(50,000 UNIT)] 12 capsule 0     Sig: Take 1 capsule by mouth once a week Patient needs an appointment for further refills         For Pharmacy Admin Tracking Only    Program: Medication Refill  CPA in place:    Recommendation Provided To:   Intervention Detail: New Rx: 1, reason: Patient Preference  Intervention Accepted By:   Gap Closed?:    Time Spent (min): 5

## 2024-12-23 RX ORDER — ERGOCALCIFEROL 1.25 MG/1
50000 CAPSULE, LIQUID FILLED ORAL WEEKLY
Qty: 12 CAPSULE | Refills: 0 | Status: SHIPPED | OUTPATIENT
Start: 2024-12-23

## 2025-01-24 SDOH — ECONOMIC STABILITY: INCOME INSECURITY: IN THE LAST 12 MONTHS, WAS THERE A TIME WHEN YOU WERE NOT ABLE TO PAY THE MORTGAGE OR RENT ON TIME?: YES

## 2025-01-24 SDOH — ECONOMIC STABILITY: FOOD INSECURITY: WITHIN THE PAST 12 MONTHS, THE FOOD YOU BOUGHT JUST DIDN'T LAST AND YOU DIDN'T HAVE MONEY TO GET MORE.: SOMETIMES TRUE

## 2025-01-24 SDOH — ECONOMIC STABILITY: FOOD INSECURITY: WITHIN THE PAST 12 MONTHS, YOU WORRIED THAT YOUR FOOD WOULD RUN OUT BEFORE YOU GOT MONEY TO BUY MORE.: SOMETIMES TRUE

## 2025-01-24 SDOH — ECONOMIC STABILITY: TRANSPORTATION INSECURITY
IN THE PAST 12 MONTHS, HAS THE LACK OF TRANSPORTATION KEPT YOU FROM MEDICAL APPOINTMENTS OR FROM GETTING MEDICATIONS?: NO

## 2025-01-27 ENCOUNTER — OFFICE VISIT (OUTPATIENT)
Age: 55
End: 2025-01-27
Payer: MEDICAID

## 2025-01-27 VITALS
TEMPERATURE: 97.5 F | SYSTOLIC BLOOD PRESSURE: 138 MMHG | OXYGEN SATURATION: 95 % | HEART RATE: 73 BPM | WEIGHT: 168 LBS | BODY MASS INDEX: 31.76 KG/M2 | RESPIRATION RATE: 16 BRPM | DIASTOLIC BLOOD PRESSURE: 82 MMHG

## 2025-01-27 DIAGNOSIS — N18.31 STAGE 3A CHRONIC KIDNEY DISEASE (HCC): ICD-10-CM

## 2025-01-27 DIAGNOSIS — E55.9 VITAMIN D DEFICIENCY: ICD-10-CM

## 2025-01-27 DIAGNOSIS — D50.8 OTHER IRON DEFICIENCY ANEMIA: ICD-10-CM

## 2025-01-27 DIAGNOSIS — R10.84 GENERALIZED ABDOMINAL PAIN: Primary | ICD-10-CM

## 2025-01-27 DIAGNOSIS — R10.84 GENERALIZED ABDOMINAL PAIN: ICD-10-CM

## 2025-01-27 PROCEDURE — 99214 OFFICE O/P EST MOD 30 MIN: CPT | Performed by: FAMILY MEDICINE

## 2025-01-27 RX ORDER — DICYCLOMINE HCL 20 MG
20 TABLET ORAL 3 TIMES DAILY PRN
Qty: 30 TABLET | Refills: 1 | Status: SHIPPED | OUTPATIENT
Start: 2025-01-27

## 2025-01-27 RX ORDER — MIRTAZAPINE 7.5 MG/1
7.5 TABLET, FILM COATED ORAL DAILY PRN
COMMUNITY
Start: 2024-12-22

## 2025-01-27 SDOH — ECONOMIC STABILITY: FOOD INSECURITY: WITHIN THE PAST 12 MONTHS, THE FOOD YOU BOUGHT JUST DIDN'T LAST AND YOU DIDN'T HAVE MONEY TO GET MORE.: NEVER TRUE

## 2025-01-27 SDOH — ECONOMIC STABILITY: FOOD INSECURITY: WITHIN THE PAST 12 MONTHS, YOU WORRIED THAT YOUR FOOD WOULD RUN OUT BEFORE YOU GOT MONEY TO BUY MORE.: NEVER TRUE

## 2025-01-27 ASSESSMENT — PATIENT HEALTH QUESTIONNAIRE - PHQ9
SUM OF ALL RESPONSES TO PHQ9 QUESTIONS 1 & 2: 0
1. LITTLE INTEREST OR PLEASURE IN DOING THINGS: NOT AT ALL
2. FEELING DOWN, DEPRESSED OR HOPELESS: NOT AT ALL
SUM OF ALL RESPONSES TO PHQ QUESTIONS 1-9: 0

## 2025-01-27 NOTE — PROGRESS NOTES
Chief Complaint   Patient presents with    Abdominal Pain       \"Have you been to the ER, urgent care clinic since your last visit?  Hospitalized since your last visit?\"    NO    “Have you seen or consulted any other health care providers outside of [facility] since your last visit?”    NO     “Have you had a pap smear?”    NO    No cervical cancer screening on file             Click Here for Release of Records Request     No results found for this visit on 25.   Vitals:    25 1422   BP: 138/82   Pulse: 73   Resp: 16   Temp: 97.5 °F (36.4 °C)   TempSrc: Infrared   SpO2: 95%   Weight: 76.2 kg (168 lb)        The patient, Humera Harris, identity was verified by name and .

## 2025-01-27 NOTE — PROGRESS NOTES
Humera Harris (:  1970) is a 54 y.o. female,Established patient, here for evaluation of the following chief complaint(s):  Abdominal Pain    No menses and nl pap smear, no vaginal  bleeding having no vag d/c, not sexually active,    Nl bm, greenish, no bloody, has had nl comolonsocpy, mostly on rt upper quad no alcohol, no trauma, current pain level is 3 out of 10 not constant feeling bloated worsened with some fatty food    Pt also had low GFR and elevated crt, pt states that she urinating fine,  No burning sensation, having now back pain, pt is currently on no NSAID's       Est, Glom Filt Rate  >60 ml/min/1.73m2 56 Low  >60     With also history of iron deficiency currently not taking any iron therapy denied dizziness lightheadedness    ALT  12 - 78 U/L 31 101 High      Constitutional: no fever, nl energy levels, nl sleep patterns, nl appetite, and nl weight fluctuations,  - exercise habits,  nad     HENT: no ear pain or nosebleeds. No blurred vision  Respiratory: no shortness of breath, wheezing cough   Cardiovascular: Has no chest pain, ,and racing heart .   Gastrointestinal: No constipation, diarrhea, nausea and vomiting.   Genitourinary: No frequency.   Musculoskeletal: Negative for joint pain.   Skin: no itching, no rash.   Neurological: Negative for dizziness, no tremors  Psychiatric/Behavioral: no for depression  no nervous/anxious, nl stress levels, and overall emotional well-being .        Constitutional: Well developed, well nourished.  non-toxic in appearance, not diaphoretic.   HEENT: PERRL. EOMI. The left TM is unremarkable. The right TM is unremarkable. No nasal  erythema noted.  THROAT: Posterior pharynx has no erythema, no exudates.    Neck:  no cervical lymphadenopathy. Neck is supple   Cardiovascular: Regular rate and rhythm, no murmurs, rubs, or gallops.   Pulmonary: Clear to auscultation bilaterally. Has no wheezing, rales or rhonchi.,  speaking in full sentences, has no accessory

## 2025-01-28 LAB
ALBUMIN SERPL-MCNC: 3.6 G/DL (ref 3.5–5)
ALBUMIN/GLOB SERPL: 0.9 (ref 1.1–2.2)
ALP SERPL-CCNC: 93 U/L (ref 45–117)
ALT SERPL-CCNC: 51 U/L (ref 12–78)
ANION GAP SERPL CALC-SCNC: 7 MMOL/L (ref 2–12)
APPEARANCE UR: CLEAR
AST SERPL-CCNC: 29 U/L (ref 15–37)
BILIRUB SERPL-MCNC: 0.5 MG/DL (ref 0.2–1)
BILIRUB UR QL: NEGATIVE
BUN SERPL-MCNC: 14 MG/DL (ref 6–20)
BUN/CREAT SERPL: 12 (ref 12–20)
CALCIUM SERPL-MCNC: 9.6 MG/DL (ref 8.5–10.1)
CHLORIDE SERPL-SCNC: 105 MMOL/L (ref 97–108)
CO2 SERPL-SCNC: 26 MMOL/L (ref 21–32)
COLOR UR: NORMAL
CREAT SERPL-MCNC: 1.18 MG/DL (ref 0.55–1.02)
ERYTHROCYTE [DISTWIDTH] IN BLOOD BY AUTOMATED COUNT: 12.5 % (ref 11.5–14.5)
EST. AVERAGE GLUCOSE BLD GHB EST-MCNC: 108 MG/DL
FERRITIN SERPL-MCNC: 218 NG/ML (ref 8–252)
FOLATE SERPL-MCNC: 16.2 NG/ML (ref 5–21)
GLOBULIN SER CALC-MCNC: 4.1 G/DL (ref 2–4)
GLUCOSE SERPL-MCNC: 84 MG/DL (ref 65–100)
GLUCOSE UR STRIP.AUTO-MCNC: NEGATIVE MG/DL
HBA1C MFR BLD: 5.4 % (ref 4–5.6)
HCT VFR BLD AUTO: 44.2 % (ref 35–47)
HGB BLD-MCNC: 14.1 G/DL (ref 11.5–16)
HGB UR QL STRIP: NEGATIVE
KETONES UR QL STRIP.AUTO: NEGATIVE MG/DL
LEUKOCYTE ESTERASE UR QL STRIP.AUTO: NEGATIVE
MCH RBC QN AUTO: 29.4 PG (ref 26–34)
MCHC RBC AUTO-ENTMCNC: 31.9 G/DL (ref 30–36.5)
MCV RBC AUTO: 92.3 FL (ref 80–99)
NITRITE UR QL STRIP.AUTO: NEGATIVE
NRBC # BLD: 0 K/UL (ref 0–0.01)
NRBC BLD-RTO: 0 PER 100 WBC
PH UR STRIP: 5.5 (ref 5–8)
PLATELET # BLD AUTO: 245 K/UL (ref 150–400)
PMV BLD AUTO: 10.5 FL (ref 8.9–12.9)
POTASSIUM SERPL-SCNC: 4.5 MMOL/L (ref 3.5–5.1)
PROT SERPL-MCNC: 7.7 G/DL (ref 6.4–8.2)
PROT UR STRIP-MCNC: NEGATIVE MG/DL
RBC # BLD AUTO: 4.79 M/UL (ref 3.8–5.2)
SODIUM SERPL-SCNC: 138 MMOL/L (ref 136–145)
SP GR UR REFRACTOMETRY: 1.02 (ref 1–1.03)
SPECIMEN HOLD: NORMAL
T4 FREE SERPL-MCNC: 0.9 NG/DL (ref 0.8–1.5)
TSH SERPL DL<=0.05 MIU/L-ACNC: 1.2 UIU/ML (ref 0.36–3.74)
UROBILINOGEN UR QL STRIP.AUTO: 0.2 EU/DL (ref 0.2–1)
VIT B12 SERPL-MCNC: 1057 PG/ML (ref 193–986)
WBC # BLD AUTO: 6.6 K/UL (ref 3.6–11)

## 2025-01-29 DIAGNOSIS — N18.31 STAGE 3A CHRONIC KIDNEY DISEASE (HCC): Primary | ICD-10-CM

## 2025-01-29 NOTE — ASSESSMENT & PLAN NOTE
Chronic, not at goal (unstable), continue current treatment plan, medication adherence emphasized, and lifestyle modifications recommended    Orders:    US ABDOMEN COMPLETE; Future    CBC; Future    Comprehensive Metabolic Panel; Future    TSH + Free T4 Panel; Future    Hemoglobin A1C; Future    Ferritin; Future    Vitamin B12 & Folate; Future    Urinalysis; Future    dicyclomine (BENTYL) 20 MG tablet; Take 1 tablet by mouth 3 times daily as needed (abd pain)

## 2025-01-29 NOTE — ASSESSMENT & PLAN NOTE
Chronic, not at goal (unstable), continue current treatment plan    Orders:    US ABDOMEN COMPLETE; Future    CBC; Future    Comprehensive Metabolic Panel; Future    TSH + Free T4 Panel; Future    Hemoglobin A1C; Future    Ferritin; Future    Vitamin B12 & Folate; Future    Urinalysis; Future    dicyclomine (BENTYL) 20 MG tablet; Take 1 tablet by mouth 3 times daily as needed (abd pain)  Patient was told to avoid heavy lifting heavy pushing diet and lean diet increase oral hydration resting heat therapy taking brisk prescription as prescribed observe for worsening condition if the pain does not respond to the current medication patient was told to call 911 PCP or go to emergency room patient acknowledged understanding

## 2025-02-01 ENCOUNTER — HOSPITAL ENCOUNTER (OUTPATIENT)
Facility: HOSPITAL | Age: 55
End: 2025-02-01
Payer: MEDICAID

## 2025-02-01 DIAGNOSIS — R10.84 GENERALIZED ABDOMINAL PAIN: ICD-10-CM

## 2025-02-01 DIAGNOSIS — E55.9 VITAMIN D DEFICIENCY: ICD-10-CM

## 2025-02-01 DIAGNOSIS — N18.31 STAGE 3A CHRONIC KIDNEY DISEASE (HCC): ICD-10-CM

## 2025-02-01 DIAGNOSIS — D50.8 OTHER IRON DEFICIENCY ANEMIA: ICD-10-CM

## 2025-02-01 PROCEDURE — 76700 US EXAM ABDOM COMPLETE: CPT

## 2025-03-10 DIAGNOSIS — D50.0 IRON DEFICIENCY ANEMIA DUE TO CHRONIC BLOOD LOSS: ICD-10-CM

## 2025-03-11 LAB
BASOPHILS # BLD: 0.05 K/UL (ref 0–0.1)
BASOPHILS NFR BLD: 0.8 % (ref 0–1)
DIFFERENTIAL METHOD BLD: NORMAL
EOSINOPHIL # BLD: 0.04 K/UL (ref 0–0.4)
EOSINOPHIL NFR BLD: 0.6 % (ref 0–7)
ERYTHROCYTE [DISTWIDTH] IN BLOOD BY AUTOMATED COUNT: 12.7 % (ref 11.5–14.5)
FERRITIN SERPL-MCNC: 181 NG/ML (ref 26–388)
HCT VFR BLD AUTO: 42.5 % (ref 35–47)
HGB BLD-MCNC: 13.7 G/DL (ref 11.5–16)
IMM GRANULOCYTES # BLD AUTO: 0.01 K/UL (ref 0–0.04)
IMM GRANULOCYTES NFR BLD AUTO: 0.2 % (ref 0–0.5)
IRON SATN MFR SERPL: 33 % (ref 20–50)
IRON SERPL-MCNC: 83 UG/DL (ref 35–150)
LYMPHOCYTES # BLD: 1.62 K/UL (ref 0.8–3.5)
LYMPHOCYTES NFR BLD: 24.9 % (ref 12–49)
MCH RBC QN AUTO: 29.8 PG (ref 26–34)
MCHC RBC AUTO-ENTMCNC: 32.2 G/DL (ref 30–36.5)
MCV RBC AUTO: 92.6 FL (ref 80–99)
MONOCYTES # BLD: 0.74 K/UL (ref 0–1)
MONOCYTES NFR BLD: 11.4 % (ref 5–13)
NEUTS SEG # BLD: 4.04 K/UL (ref 1.8–8)
NEUTS SEG NFR BLD: 62.1 % (ref 32–75)
NRBC # BLD: 0 K/UL (ref 0–0.01)
NRBC BLD-RTO: 0 PER 100 WBC
PLATELET # BLD AUTO: 240 K/UL (ref 150–400)
PMV BLD AUTO: 10.7 FL (ref 8.9–12.9)
RBC # BLD AUTO: 4.59 M/UL (ref 3.8–5.2)
TIBC SERPL-MCNC: 248 UG/DL (ref 250–450)
WBC # BLD AUTO: 6.5 K/UL (ref 3.6–11)

## 2025-03-12 ENCOUNTER — OFFICE VISIT (OUTPATIENT)
Age: 55
End: 2025-03-12
Payer: MEDICAID

## 2025-03-12 VITALS
HEART RATE: 73 BPM | BODY MASS INDEX: 31.34 KG/M2 | OXYGEN SATURATION: 98 % | DIASTOLIC BLOOD PRESSURE: 88 MMHG | SYSTOLIC BLOOD PRESSURE: 135 MMHG | RESPIRATION RATE: 17 BRPM | HEIGHT: 61 IN | TEMPERATURE: 98 F | WEIGHT: 166 LBS

## 2025-03-12 DIAGNOSIS — D50.0 IRON DEFICIENCY ANEMIA DUE TO CHRONIC BLOOD LOSS: Primary | ICD-10-CM

## 2025-03-12 PROCEDURE — 99213 OFFICE O/P EST LOW 20 MIN: CPT | Performed by: NURSE PRACTITIONER

## 2025-03-12 NOTE — PROGRESS NOTES
Humera Harris is a 54 y.o. female who presents for follow up of   Chief Complaint   Patient presents with    Follow-up     GEOVANNA       The patient reports no new clinical symptoms or new complaints since last clinic evaluation. She reports no significant changes at this time.       No interval hospitalizations reported    No interval surgery or procedures reported    No reported new medication changes reported       Medications reviewed with the patient, and chart updated to reflect changes.        
bleeding and bruising.     Review of systems was obtained and pertinent findings reviewed above. Past medical history, social history, family history, medications, and allergies are located in the electronic medical record.     Past Medical History:   Diagnosis Date    Anxiety 2023    Contact dermatitis and other eczema, due to unspecified cause     GERD (gastroesophageal reflux disease)     Headache(784.0)     [facility]     Past Surgical History:   Procedure Laterality Date    COLONOSCOPY N/A 2022    COLONOSCOPY performed by Shamar Marcus MD at [facility]COPY    COLONOSCOPY,DIAGNOSTIC  2022         GYN       X1    GYN      D&C in     TUBAL LIGATION       Social History     Socioeconomic History    Marital status: Single     Spouse name: None    Number of children: None    Years of education: None    Highest education level: None   Tobacco Use    Smoking status: Former     Current packs/day: 0.00     Average packs/day: 0.3 packs/day for 18.0 years (4.5 ttl pk-yrs)     Types: Cigarettes     Start date: 1976     Quit date: 1994     Years since quittin.2    Smokeless tobacco: Never   Substance and Sexual Activity    Alcohol use: Yes    Drug use: Not Currently     Types: Marijuana (Weed)     Social Drivers of Health     Financial Resource Strain: Low Risk  (2023)    Overall Financial Resource Strain (CARDIA)     Difficulty of Paying Living Expenses: Not hard at all   Food Insecurity: No Food Insecurity (2025)    Hunger Vital Sign     Worried About Running Out of Food in the Last Year: Never true     Ran Out of Food in the Last Year: Never true   Transportation Needs: No Transportation Needs (2025)    PRAPARE - Transportation     Lack of Transportation (Medical): No     Lack of Transportation (Non-Medical): No   Housing Stability: Low Risk  (2025)    Housing Stability Vital Sign     Unable to Pay for Housing in

## 2025-07-28 ENCOUNTER — TRANSCRIBE ORDERS (OUTPATIENT)
Facility: HOSPITAL | Age: 55
End: 2025-07-28

## 2025-07-28 DIAGNOSIS — N17.9 ACUTE RENAL FAILURE, UNSPECIFIED ACUTE RENAL FAILURE TYPE: Primary | ICD-10-CM

## 2025-07-30 ENCOUNTER — HOSPITAL ENCOUNTER (OUTPATIENT)
Facility: HOSPITAL | Age: 55
Discharge: HOME | End: 2025-08-02
Attending: FAMILY MEDICINE
Payer: MEDICAID

## 2025-07-30 DIAGNOSIS — Z12.31 ENCOUNTER FOR SCREENING MAMMOGRAM FOR MALIGNANT NEOPLASM OF BREAST: ICD-10-CM

## 2025-07-30 PROCEDURE — 77063 BREAST TOMOSYNTHESIS BI: CPT

## 2025-08-05 RX ORDER — ERGOCALCIFEROL 1.25 MG/1
50000 CAPSULE, LIQUID FILLED ORAL WEEKLY
Qty: 4 CAPSULE | Refills: 2 | Status: SHIPPED | OUTPATIENT
Start: 2025-08-05

## 2025-08-27 ENCOUNTER — HOSPITAL ENCOUNTER (OUTPATIENT)
Facility: HOSPITAL | Age: 55
Discharge: HOME | End: 2025-08-30
Attending: INTERNAL MEDICINE
Payer: MEDICAID

## 2025-08-27 DIAGNOSIS — N17.9 ACUTE RENAL FAILURE, UNSPECIFIED ACUTE RENAL FAILURE TYPE: ICD-10-CM

## 2025-08-27 PROCEDURE — 76770 US EXAM ABDO BACK WALL COMP: CPT

## (undated) DEVICE — TOWEL 4 PLY TISS 19X30 SUE WHT

## (undated) DEVICE — BASIN EMSIS 16OZ GRAPHITE PLAS KID SHP MOLD GRAD FOR ORAL

## (undated) DEVICE — Device

## (undated) DEVICE — ELECTRODE,RADIOTRANSLUCENT,FOAM,5PK: Brand: MEDLINE

## (undated) DEVICE — SOLIDIFIER FLD 2OZ 1500CC N DISINF IN BTL DISP SAFESORB

## (undated) DEVICE — SYR 3ML LL TIP 1/10ML GRAD --

## (undated) DEVICE — 1200 GUARD II KIT W/5MM TUBE W/O VAC TUBE: Brand: GUARDIAN

## (undated) DEVICE — Z DISCONTINUED PER MEDLINE LINE GAS SAMPLING O2/CO2 LNG AD 13 FT NSL W/ TBNG FILTERLINE

## (undated) DEVICE — CATH IV AUTOGRD BC PNK 20GA 25 -- INSYTE

## (undated) DEVICE — SYR 10ML LUER LOK 1/5ML GRAD --

## (undated) DEVICE — SET ADMIN 16ML TBNG L100IN 2 Y INJ SITE IV PIGGY BK DISP

## (undated) DEVICE — NEONATAL-ADULT SPO2 SENSOR: Brand: NELLCOR

## (undated) DEVICE — HYPODERMIC SAFETY NEEDLE: Brand: MAGELLAN